# Patient Record
Sex: MALE | Race: WHITE | NOT HISPANIC OR LATINO | Employment: UNEMPLOYED | URBAN - METROPOLITAN AREA
[De-identification: names, ages, dates, MRNs, and addresses within clinical notes are randomized per-mention and may not be internally consistent; named-entity substitution may affect disease eponyms.]

---

## 2023-03-13 ENCOUNTER — TELEPHONE (OUTPATIENT)
Dept: PRIMARY CARE | Facility: CLINIC | Age: 68
End: 2023-03-13
Payer: COMMERCIAL

## 2023-03-17 DIAGNOSIS — N40.1 BENIGN PROSTATIC HYPERPLASIA WITH LOWER URINARY TRACT SYMPTOMS, SYMPTOM DETAILS UNSPECIFIED: Primary | ICD-10-CM

## 2023-03-17 RX ORDER — TADALAFIL 5 MG/1
TABLET ORAL
COMMUNITY
Start: 2019-07-29 | End: 2023-03-17 | Stop reason: SDUPTHER

## 2023-03-17 RX ORDER — TADALAFIL 2.5 MG/1
1 TABLET ORAL DAILY
COMMUNITY
Start: 2022-05-03 | End: 2023-03-17 | Stop reason: SDUPTHER

## 2023-03-17 RX ORDER — LOSARTAN POTASSIUM 50 MG/1
1 TABLET ORAL DAILY
COMMUNITY
Start: 2022-11-03 | End: 2024-01-16 | Stop reason: WASHOUT

## 2023-03-17 RX ORDER — TADALAFIL 2.5 MG/1
2.5 TABLET ORAL DAILY
Qty: 30 TABLET | Refills: 2 | Status: SHIPPED | OUTPATIENT
Start: 2023-03-17 | End: 2024-03-12 | Stop reason: WASHOUT

## 2023-03-17 RX ORDER — AMLODIPINE BESYLATE 5 MG/1
1 TABLET ORAL DAILY
COMMUNITY
Start: 2023-02-28 | End: 2023-06-06

## 2023-03-17 RX ORDER — ONDANSETRON 8 MG/1
TABLET, ORALLY DISINTEGRATING ORAL EVERY 6 HOURS
COMMUNITY
Start: 2021-02-28 | End: 2024-03-12 | Stop reason: ALTCHOICE

## 2023-03-17 RX ORDER — LOSARTAN POTASSIUM 100 MG/1
TABLET ORAL
COMMUNITY
Start: 2023-01-04 | End: 2023-06-06

## 2023-06-06 ENCOUNTER — OFFICE VISIT (OUTPATIENT)
Dept: PRIMARY CARE | Facility: CLINIC | Age: 68
End: 2023-06-06
Payer: COMMERCIAL

## 2023-06-06 VITALS
SYSTOLIC BLOOD PRESSURE: 134 MMHG | BODY MASS INDEX: 23.96 KG/M2 | TEMPERATURE: 98 F | OXYGEN SATURATION: 99 % | HEIGHT: 69 IN | DIASTOLIC BLOOD PRESSURE: 80 MMHG | WEIGHT: 161.8 LBS | HEART RATE: 72 BPM

## 2023-06-06 DIAGNOSIS — J01.90 ACUTE NON-RECURRENT SINUSITIS, UNSPECIFIED LOCATION: Primary | ICD-10-CM

## 2023-06-06 PROCEDURE — 99213 OFFICE O/P EST LOW 20 MIN: CPT | Performed by: FAMILY MEDICINE

## 2023-06-06 PROCEDURE — 1159F MED LIST DOCD IN RCRD: CPT | Performed by: FAMILY MEDICINE

## 2023-06-06 PROCEDURE — 1036F TOBACCO NON-USER: CPT | Performed by: FAMILY MEDICINE

## 2023-06-06 PROCEDURE — 1160F RVW MEDS BY RX/DR IN RCRD: CPT | Performed by: FAMILY MEDICINE

## 2023-06-06 RX ORDER — AZITHROMYCIN 250 MG/1
TABLET, FILM COATED ORAL
Qty: 6 TABLET | Refills: 0 | Status: SHIPPED | OUTPATIENT
Start: 2023-06-06 | End: 2023-06-11

## 2023-06-06 RX ORDER — TADALAFIL 5 MG/1
TABLET ORAL
COMMUNITY
Start: 2023-05-09 | End: 2023-12-08 | Stop reason: SDUPTHER

## 2023-06-06 SDOH — ECONOMIC STABILITY: HOUSING INSECURITY
IN THE LAST 12 MONTHS, WAS THERE A TIME WHEN YOU DID NOT HAVE A STEADY PLACE TO SLEEP OR SLEPT IN A SHELTER (INCLUDING NOW)?: NO

## 2023-06-06 SDOH — ECONOMIC STABILITY: TRANSPORTATION INSECURITY
IN THE PAST 12 MONTHS, HAS THE LACK OF TRANSPORTATION KEPT YOU FROM MEDICAL APPOINTMENTS OR FROM GETTING MEDICATIONS?: NO

## 2023-06-06 SDOH — ECONOMIC STABILITY: TRANSPORTATION INSECURITY
IN THE PAST 12 MONTHS, HAS LACK OF TRANSPORTATION KEPT YOU FROM MEETINGS, WORK, OR FROM GETTING THINGS NEEDED FOR DAILY LIVING?: NO

## 2023-06-06 SDOH — ECONOMIC STABILITY: FOOD INSECURITY: WITHIN THE PAST 12 MONTHS, THE FOOD YOU BOUGHT JUST DIDN'T LAST AND YOU DIDN'T HAVE MONEY TO GET MORE.: NEVER TRUE

## 2023-06-06 SDOH — ECONOMIC STABILITY: INCOME INSECURITY: IN THE LAST 12 MONTHS, WAS THERE A TIME WHEN YOU WERE NOT ABLE TO PAY THE MORTGAGE OR RENT ON TIME?: NO

## 2023-06-06 SDOH — ECONOMIC STABILITY: FOOD INSECURITY: WITHIN THE PAST 12 MONTHS, YOU WORRIED THAT YOUR FOOD WOULD RUN OUT BEFORE YOU GOT MONEY TO BUY MORE.: NEVER TRUE

## 2023-06-06 ASSESSMENT — PATIENT HEALTH QUESTIONNAIRE - PHQ9
1. LITTLE INTEREST OR PLEASURE IN DOING THINGS: NOT AT ALL
SUM OF ALL RESPONSES TO PHQ9 QUESTIONS 1 & 2: 0
2. FEELING DOWN, DEPRESSED OR HOPELESS: NOT AT ALL

## 2023-06-06 ASSESSMENT — ENCOUNTER SYMPTOMS
DEPRESSION: 0
LOSS OF SENSATION IN FEET: 0
COUGH: 1
OCCASIONAL FEELINGS OF UNSTEADINESS: 0
RHINORRHEA: 1
SINUS PRESSURE: 1

## 2023-06-06 ASSESSMENT — SOCIAL DETERMINANTS OF HEALTH (SDOH)
WITHIN THE LAST YEAR, HAVE YOU BEEN KICKED, HIT, SLAPPED, OR OTHERWISE PHYSICALLY HURT BY YOUR PARTNER OR EX-PARTNER?: NO
WITHIN THE LAST YEAR, HAVE TO BEEN RAPED OR FORCED TO HAVE ANY KIND OF SEXUAL ACTIVITY BY YOUR PARTNER OR EX-PARTNER?: NO
WITHIN THE LAST YEAR, HAVE YOU BEEN AFRAID OF YOUR PARTNER OR EX-PARTNER?: NO
WITHIN THE LAST YEAR, HAVE YOU BEEN HUMILIATED OR EMOTIONALLY ABUSED IN OTHER WAYS BY YOUR PARTNER OR EX-PARTNER?: NO
HOW HARD IS IT FOR YOU TO PAY FOR THE VERY BASICS LIKE FOOD, HOUSING, MEDICAL CARE, AND HEATING?: NOT HARD AT ALL

## 2023-06-06 ASSESSMENT — LIFESTYLE VARIABLES
HOW OFTEN DO YOU HAVE A DRINK CONTAINING ALCOHOL: NEVER
SKIP TO QUESTIONS 9-10: 1
HOW MANY STANDARD DRINKS CONTAINING ALCOHOL DO YOU HAVE ON A TYPICAL DAY: PATIENT DOES NOT DRINK
AUDIT-C TOTAL SCORE: 0
HOW OFTEN DO YOU HAVE SIX OR MORE DRINKS ON ONE OCCASION: NEVER

## 2023-06-06 ASSESSMENT — PAIN SCALES - GENERAL: PAINLEVEL: 0-NO PAIN

## 2023-06-06 NOTE — PROGRESS NOTES
"Subjective   Patient ID: James Fritsche is a 67 y.o. male who presents for sick (C/o sinus infection x wk).    HPI     Review of Systems   HENT:  Positive for postnasal drip, rhinorrhea and sinus pressure.    Respiratory:  Positive for cough.        Objective   /80 (BP Location: Left arm)   Pulse 72   Temp 36.7 °C (98 °F) (Temporal)   Ht 1.753 m (5' 9\")   Wt 73.4 kg (161 lb 12.8 oz)   SpO2 99%   BMI 23.89 kg/m²     Physical Exam  Vitals and nursing note reviewed.   HENT:      Ears:      Comments: Fluid behind both TMs bilaterally     Mouth/Throat:      Comments: PND  Pulmonary:      Breath sounds: Normal breath sounds.   Neurological:      Mental Status: He is alert.         Assessment/Plan   Problem List Items Addressed This Visit    None  Visit Diagnoses       Acute non-recurrent sinusitis, unspecified location    -  Primary    Relevant Medications    azithromycin (Zithromax) 250 mg tablet               "

## 2023-08-30 PROBLEM — H91.8X3 ASYMMETRICAL HEARING LOSS: Status: ACTIVE | Noted: 2023-08-30

## 2023-08-30 PROBLEM — H93.8X1 SENSATION OF PLUGGED EAR ON RIGHT SIDE: Status: ACTIVE | Noted: 2023-08-30

## 2023-08-30 PROBLEM — I45.10 INCOMPLETE RBBB: Status: ACTIVE | Noted: 2023-08-30

## 2023-08-30 PROBLEM — B35.1 ONYCHOMYCOSIS OF TOENAIL: Status: ACTIVE | Noted: 2023-08-30

## 2023-08-30 PROBLEM — U07.1 COVID-19: Status: ACTIVE | Noted: 2023-08-30

## 2023-08-30 PROBLEM — M19.90 CHRONIC OSTEOARTHRITIS: Status: ACTIVE | Noted: 2023-08-30

## 2023-08-30 PROBLEM — H91.91 UNILATERAL HEARING LOSS, RIGHT: Status: ACTIVE | Noted: 2023-08-30

## 2023-08-30 PROBLEM — J01.90 ACUTE SINUSITIS: Status: ACTIVE | Noted: 2023-08-30

## 2023-08-30 PROBLEM — H61.23 BILATERAL IMPACTED CERUMEN: Status: ACTIVE | Noted: 2023-08-30

## 2023-08-30 PROBLEM — I20.89 EFFORT ANGINA (CMS-HCC): Status: ACTIVE | Noted: 2023-08-30

## 2023-08-30 PROBLEM — B02.9 HERPES ZOSTER: Status: ACTIVE | Noted: 2023-08-30

## 2023-08-30 PROBLEM — I10 BENIGN HYPERTENSION: Status: ACTIVE | Noted: 2023-08-30

## 2023-08-30 PROBLEM — N40.0 BENIGN ENLARGEMENT OF PROSTATE: Status: ACTIVE | Noted: 2023-08-30

## 2023-08-30 PROBLEM — E78.2 HYPERLIPIDEMIA, MIXED: Status: ACTIVE | Noted: 2023-08-30

## 2023-08-30 RX ORDER — AMLODIPINE BESYLATE 5 MG/1
1 TABLET ORAL DAILY
COMMUNITY
Start: 2023-02-28 | End: 2024-01-16 | Stop reason: WASHOUT

## 2023-08-30 RX ORDER — ACYCLOVIR 800 MG/1
TABLET ORAL
COMMUNITY
End: 2024-03-12 | Stop reason: ALTCHOICE

## 2023-08-30 RX ORDER — IBUPROFEN 800 MG/1
TABLET ORAL EVERY 8 HOURS
COMMUNITY
End: 2024-03-12 | Stop reason: ALTCHOICE

## 2023-10-19 ENCOUNTER — TELEPHONE (OUTPATIENT)
Dept: PRIMARY CARE | Facility: CLINIC | Age: 68
End: 2023-10-19
Payer: COMMERCIAL

## 2023-10-19 NOTE — TELEPHONE ENCOUNTER
Pt stopped in to have his BP checked because it has been elevated. Today was 153/94- he has not been taking his BP meds and would like you to give him a call back.

## 2023-11-01 ENCOUNTER — TELEPHONE (OUTPATIENT)
Dept: PRIMARY CARE | Facility: CLINIC | Age: 68
End: 2023-11-01
Payer: COMMERCIAL

## 2023-12-08 DIAGNOSIS — N52.9 ERECTILE DYSFUNCTION, UNSPECIFIED ERECTILE DYSFUNCTION TYPE: Primary | ICD-10-CM

## 2023-12-08 RX ORDER — TADALAFIL 5 MG/1
TABLET ORAL
Qty: 30 TABLET | Refills: 1 | Status: SHIPPED | OUTPATIENT
Start: 2023-12-08 | End: 2024-03-12 | Stop reason: WASHOUT

## 2024-01-12 ENCOUNTER — NURSE TRIAGE (OUTPATIENT)
Dept: PRIMARY CARE | Facility: CLINIC | Age: 69
End: 2024-01-12
Payer: COMMERCIAL

## 2024-01-16 ENCOUNTER — OFFICE VISIT (OUTPATIENT)
Dept: PRIMARY CARE | Facility: CLINIC | Age: 69
End: 2024-01-16
Payer: COMMERCIAL

## 2024-01-16 VITALS
BODY MASS INDEX: 26.55 KG/M2 | TEMPERATURE: 97.4 F | WEIGHT: 175.2 LBS | SYSTOLIC BLOOD PRESSURE: 160 MMHG | HEIGHT: 68 IN | DIASTOLIC BLOOD PRESSURE: 88 MMHG | HEART RATE: 63 BPM | OXYGEN SATURATION: 100 %

## 2024-01-16 DIAGNOSIS — B35.1 ONYCHOMYCOSIS OF TOENAIL: ICD-10-CM

## 2024-01-16 DIAGNOSIS — I10 BENIGN HYPERTENSION: Primary | ICD-10-CM

## 2024-01-16 DIAGNOSIS — I73.00 RAYNAUD'S DISEASE WITHOUT GANGRENE: ICD-10-CM

## 2024-01-16 PROCEDURE — 1159F MED LIST DOCD IN RCRD: CPT | Performed by: FAMILY MEDICINE

## 2024-01-16 PROCEDURE — 1160F RVW MEDS BY RX/DR IN RCRD: CPT | Performed by: FAMILY MEDICINE

## 2024-01-16 PROCEDURE — 1126F AMNT PAIN NOTED NONE PRSNT: CPT | Performed by: FAMILY MEDICINE

## 2024-01-16 PROCEDURE — 1036F TOBACCO NON-USER: CPT | Performed by: FAMILY MEDICINE

## 2024-01-16 PROCEDURE — 3077F SYST BP >= 140 MM HG: CPT | Performed by: FAMILY MEDICINE

## 2024-01-16 PROCEDURE — 99213 OFFICE O/P EST LOW 20 MIN: CPT | Performed by: FAMILY MEDICINE

## 2024-01-16 PROCEDURE — 3079F DIAST BP 80-89 MM HG: CPT | Performed by: FAMILY MEDICINE

## 2024-01-16 RX ORDER — LOSARTAN POTASSIUM 100 MG/1
100 TABLET ORAL DAILY
COMMUNITY
Start: 2023-12-20 | End: 2024-01-16 | Stop reason: ALTCHOICE

## 2024-01-16 RX ORDER — NIFEDIPINE 60 MG/1
60 TABLET, FILM COATED, EXTENDED RELEASE ORAL DAILY
Qty: 90 TABLET | Refills: 1 | Status: SHIPPED | OUTPATIENT
Start: 2024-01-16 | End: 2024-03-12 | Stop reason: SINTOL

## 2024-01-16 ASSESSMENT — ENCOUNTER SYMPTOMS
OCCASIONAL FEELINGS OF UNSTEADINESS: 0
LOSS OF SENSATION IN FEET: 0
LIGHT-HEADEDNESS: 1
DEPRESSION: 0
CARDIOVASCULAR NEGATIVE: 1

## 2024-01-16 ASSESSMENT — PAIN SCALES - GENERAL: PAINLEVEL: 0-NO PAIN

## 2024-01-16 NOTE — PROGRESS NOTES
"Subjective   Patient ID: James Fritsche is a 68 y.o. male who presents for misc (Discuss bp).    HPI     Review of Systems   Cardiovascular: Negative.         Increased BP   Neurological:  Positive for light-headedness.       Objective   /88 (BP Location: Left arm)   Pulse 63   Temp 36.3 °C (97.4 °F) (Temporal)   Ht 1.727 m (5' 8\")   Wt 79.5 kg (175 lb 3.2 oz)   SpO2 100%   BMI 26.64 kg/m²     Physical Exam  Cardiovascular:      Rate and Rhythm: Normal rate and regular rhythm.      Heart sounds: Normal heart sounds.   Pulmonary:      Breath sounds: Normal breath sounds.         Assessment/Plan patient seen here to follow-up on his blood pressure and his Raynaud's phenomena.  We are switching to nifedipine for his blood pressure and for help with his Raynaud's phenomena  Problem List Items Addressed This Visit             ICD-10-CM    Benign hypertension - Primary I10    Relevant Medications    NIFEdipine ER (Adalat CC) 60 mg 24 hr tablet    Onychomycosis of toenail B35.1     Other Visit Diagnoses         Codes    Raynaud's disease without gangrene     I73.00    Relevant Medications    NIFEdipine ER (Adalat CC) 60 mg 24 hr tablet               "

## 2024-03-12 ENCOUNTER — OFFICE VISIT (OUTPATIENT)
Dept: PRIMARY CARE | Facility: CLINIC | Age: 69
End: 2024-03-12
Payer: COMMERCIAL

## 2024-03-12 VITALS
WEIGHT: 176.4 LBS | BODY MASS INDEX: 26.13 KG/M2 | SYSTOLIC BLOOD PRESSURE: 124 MMHG | TEMPERATURE: 97.7 F | DIASTOLIC BLOOD PRESSURE: 70 MMHG | HEIGHT: 69 IN

## 2024-03-12 DIAGNOSIS — N40.1 BENIGN PROSTATIC HYPERPLASIA WITH LOWER URINARY TRACT SYMPTOMS, SYMPTOM DETAILS UNSPECIFIED: ICD-10-CM

## 2024-03-12 DIAGNOSIS — H91.8X3 ASYMMETRICAL HEARING LOSS: ICD-10-CM

## 2024-03-12 DIAGNOSIS — I45.10 INCOMPLETE RBBB: ICD-10-CM

## 2024-03-12 DIAGNOSIS — I10 BENIGN HYPERTENSION: ICD-10-CM

## 2024-03-12 DIAGNOSIS — Z00.00 MEDICARE ANNUAL WELLNESS VISIT, SUBSEQUENT: Primary | ICD-10-CM

## 2024-03-12 DIAGNOSIS — E78.2 HYPERLIPIDEMIA, MIXED: ICD-10-CM

## 2024-03-12 DIAGNOSIS — H61.23 BILATERAL IMPACTED CERUMEN: ICD-10-CM

## 2024-03-12 DIAGNOSIS — L98.9 SKIN LESIONS: ICD-10-CM

## 2024-03-12 LAB
ALBUMIN SERPL BCP-MCNC: 4.4 G/DL (ref 3.4–5)
ALP SERPL-CCNC: 55 U/L (ref 33–136)
ALT SERPL W P-5'-P-CCNC: 25 U/L (ref 10–52)
ANION GAP SERPL CALC-SCNC: 14 MMOL/L (ref 10–20)
AST SERPL W P-5'-P-CCNC: 25 U/L (ref 9–39)
BASOPHILS # BLD AUTO: 0.02 X10*3/UL (ref 0–0.1)
BASOPHILS NFR BLD AUTO: 0.4 %
BILIRUB SERPL-MCNC: 0.5 MG/DL (ref 0–1.2)
BUN SERPL-MCNC: 22 MG/DL (ref 6–23)
CALCIUM SERPL-MCNC: 10.1 MG/DL (ref 8.6–10.6)
CHLORIDE SERPL-SCNC: 104 MMOL/L (ref 98–107)
CHOLEST SERPL-MCNC: 205 MG/DL (ref 0–199)
CHOLESTEROL/HDL RATIO: 3.7
CO2 SERPL-SCNC: 32 MMOL/L (ref 21–32)
CREAT SERPL-MCNC: 0.96 MG/DL (ref 0.5–1.3)
EGFRCR SERPLBLD CKD-EPI 2021: 87 ML/MIN/1.73M*2
EOSINOPHIL # BLD AUTO: 0.04 X10*3/UL (ref 0–0.7)
EOSINOPHIL NFR BLD AUTO: 0.8 %
ERYTHROCYTE [DISTWIDTH] IN BLOOD BY AUTOMATED COUNT: 13.7 % (ref 11.5–14.5)
GLUCOSE SERPL-MCNC: 79 MG/DL (ref 74–99)
HCT VFR BLD AUTO: 45.6 % (ref 41–52)
HDLC SERPL-MCNC: 55.8 MG/DL
HGB BLD-MCNC: 15 G/DL (ref 13.5–17.5)
IMM GRANULOCYTES # BLD AUTO: 0.01 X10*3/UL (ref 0–0.7)
IMM GRANULOCYTES NFR BLD AUTO: 0.2 % (ref 0–0.9)
LDLC SERPL CALC-MCNC: 133 MG/DL
LYMPHOCYTES # BLD AUTO: 1.05 X10*3/UL (ref 1.2–4.8)
LYMPHOCYTES NFR BLD AUTO: 21.2 %
MCH RBC QN AUTO: 31.4 PG (ref 26–34)
MCHC RBC AUTO-ENTMCNC: 32.9 G/DL (ref 32–36)
MCV RBC AUTO: 95 FL (ref 80–100)
MONOCYTES # BLD AUTO: 0.34 X10*3/UL (ref 0.1–1)
MONOCYTES NFR BLD AUTO: 6.9 %
NEUTROPHILS # BLD AUTO: 3.5 X10*3/UL (ref 1.2–7.7)
NEUTROPHILS NFR BLD AUTO: 70.5 %
NON HDL CHOLESTEROL: 149 MG/DL (ref 0–149)
NRBC BLD-RTO: 0 /100 WBCS (ref 0–0)
PLATELET # BLD AUTO: 205 X10*3/UL (ref 150–450)
POTASSIUM SERPL-SCNC: 5.5 MMOL/L (ref 3.5–5.3)
PROT SERPL-MCNC: 6.7 G/DL (ref 6.4–8.2)
PSA SERPL-MCNC: 0.93 NG/ML
RBC # BLD AUTO: 4.78 X10*6/UL (ref 4.5–5.9)
SODIUM SERPL-SCNC: 144 MMOL/L (ref 136–145)
TRIGL SERPL-MCNC: 81 MG/DL (ref 0–149)
VLDL: 16 MG/DL (ref 0–40)
WBC # BLD AUTO: 5 X10*3/UL (ref 4.4–11.3)

## 2024-03-12 PROCEDURE — 99497 ADVNCD CARE PLAN 30 MIN: CPT | Performed by: FAMILY MEDICINE

## 2024-03-12 PROCEDURE — 99397 PER PM REEVAL EST PAT 65+ YR: CPT | Performed by: FAMILY MEDICINE

## 2024-03-12 PROCEDURE — 1160F RVW MEDS BY RX/DR IN RCRD: CPT | Performed by: FAMILY MEDICINE

## 2024-03-12 PROCEDURE — 80061 LIPID PANEL: CPT

## 2024-03-12 PROCEDURE — 1036F TOBACCO NON-USER: CPT | Performed by: FAMILY MEDICINE

## 2024-03-12 PROCEDURE — 3078F DIAST BP <80 MM HG: CPT | Performed by: FAMILY MEDICINE

## 2024-03-12 PROCEDURE — 1170F FXNL STATUS ASSESSED: CPT | Performed by: FAMILY MEDICINE

## 2024-03-12 PROCEDURE — 36415 COLL VENOUS BLD VENIPUNCTURE: CPT

## 2024-03-12 PROCEDURE — 1126F AMNT PAIN NOTED NONE PRSNT: CPT | Performed by: FAMILY MEDICINE

## 2024-03-12 PROCEDURE — 80053 COMPREHEN METABOLIC PANEL: CPT

## 2024-03-12 PROCEDURE — 84153 ASSAY OF PSA TOTAL: CPT

## 2024-03-12 PROCEDURE — 3074F SYST BP LT 130 MM HG: CPT | Performed by: FAMILY MEDICINE

## 2024-03-12 PROCEDURE — 1159F MED LIST DOCD IN RCRD: CPT | Performed by: FAMILY MEDICINE

## 2024-03-12 PROCEDURE — 85025 COMPLETE CBC W/AUTO DIFF WBC: CPT

## 2024-03-12 PROCEDURE — G0439 PPPS, SUBSEQ VISIT: HCPCS | Performed by: FAMILY MEDICINE

## 2024-03-12 RX ORDER — AZITHROMYCIN 250 MG/1
TABLET, FILM COATED ORAL
COMMUNITY
Start: 2024-02-16 | End: 2024-03-12 | Stop reason: WASHOUT

## 2024-03-12 RX ORDER — LOSARTAN POTASSIUM 100 MG/1
100 TABLET ORAL DAILY
Qty: 30 TABLET | Refills: 11 | COMMUNITY
Start: 2024-03-12 | End: 2024-03-28 | Stop reason: SDUPTHER

## 2024-03-12 RX ORDER — TADALAFIL 2.5 MG/1
2.5 TABLET ORAL DAILY
Qty: 30 TABLET | Refills: 2 | COMMUNITY
Start: 2024-03-12 | End: 2024-06-05 | Stop reason: WASHOUT

## 2024-03-12 ASSESSMENT — GERIATRIC MINI NUTRITIONAL ASSESSMENT (MNA)
D HAS SUFFERED PSYCHOLOGICAL STRESS OR ACUTE DISEASE IN THE PAST 3 MONTHS?: NO
B WEIGHT LOSS DURING THE LAST 3 MONTHS: NO WEIGHT LOSS
C GENERAL MOBILITY: GOES OUT
E NEUROPSYCHOLOGICAL PROBLEMS: NO PSYCHOLOGICAL PROBLEMS
A HAS FOOD INTAKE DECLINED OVER THE PAST 3 MONTHS DUE TO LOSS OF APPETITE, DIGESTIVE PROBLEMS, CHEWING OR SWALLOWING DIFFICULTIES?: NO DECREASE IN FOOD INTAKE

## 2024-03-12 ASSESSMENT — ACTIVITIES OF DAILY LIVING (ADL)
TAKING MEDICATION: INDEPENDENT
NEEDS ASSISTANCE WITH FOOD: INDEPENDENT
MANAGING FINANCES: INDEPENDENT
USING TELEPHONE: INDEPENDENT
WALKS IN HOME: INDEPENDENT
GROOMING: INDEPENDENT
PATIENT'S MEMORY ADEQUATE TO SAFELY COMPLETE DAILY ACTIVITIES?: YES
BATHING: INDEPENDENT
GROCERY SHOPPING: INDEPENDENT
FEEDING YOURSELF: INDEPENDENT
EATING: INDEPENDENT
USING TRANSPORTATION: INDEPENDENT
STIL DRIVING: YES
TOILETING: INDEPENDENT
ADEQUATE_TO_COMPLETE_ADL: YES
JUDGMENT_ADEQUATE_SAFELY_COMPLETE_DAILY_ACTIVITIES: YES
PREPARING MEALS: INDEPENDENT
DOING HOUSEWORK: INDEPENDENT
DRESSING YOURSELF: INDEPENDENT

## 2024-03-12 ASSESSMENT — ANXIETY QUESTIONNAIRES
5. BEING SO RESTLESS THAT IT IS HARD TO SIT STILL: NOT AT ALL
7. FEELING AFRAID AS IF SOMETHING AWFUL MIGHT HAPPEN: NOT AT ALL
2. NOT BEING ABLE TO STOP OR CONTROL WORRYING: NOT AT ALL
4. TROUBLE RELAXING: NOT AT ALL
3. WORRYING TOO MUCH ABOUT DIFFERENT THINGS: NOT AT ALL
6. BECOMING EASILY ANNOYED OR IRRITABLE: NOT AT ALL
GAD7 TOTAL SCORE: 0
1. FEELING NERVOUS, ANXIOUS, OR ON EDGE: NOT AT ALL

## 2024-03-12 ASSESSMENT — ENCOUNTER SYMPTOMS
DEPRESSION: 0
NEUROLOGICAL NEGATIVE: 1
CARDIOVASCULAR NEGATIVE: 1
MUSCULOSKELETAL NEGATIVE: 1
LOSS OF SENSATION IN FEET: 0
CONSTITUTIONAL NEGATIVE: 1
PSYCHIATRIC NEGATIVE: 1
ENDOCRINE NEGATIVE: 1
GASTROINTESTINAL NEGATIVE: 1
RESPIRATORY NEGATIVE: 1
OCCASIONAL FEELINGS OF UNSTEADINESS: 0

## 2024-03-12 ASSESSMENT — PAIN SCALES - GENERAL: PAINLEVEL: 0-NO PAIN

## 2024-03-12 NOTE — ACP (ADVANCE CARE PLANNING)
Confirming Previous Code Status:   Advance Care Planning Note     Discussion Date: 03/12/24   Discussion Participants: patient    The patient wishes to discuss Advance Care Planning today and the following is a brief summary of our discussion.     Patient has capacity to make their own medical decisions: Yes  Health Care Agent/Surrogate Decision Maker documented in chart: No    Documents on file and valid:  Advance Directive/Living Will: No   Health Care Power of : No  Other: none    Communication of Medical Status/Prognosis:   yes     Communication of Treatment Goals/Options:   yes     Treatment Decisions  Goals of Care: survival is paramount regardless of prognosis, treatment outcome, or burden   yes  Follow Up Plan  no  Team Members  myself  Time Statement: Total face to face time spent on advance care planning was 16 minutes with 16 minutes spent in counseling, including the explanation.    Alexandru Aguilar DO  3/12/2024 10:14 AM

## 2024-03-12 NOTE — PROGRESS NOTES
"Subjective   Patient ID: James Fritsche is a 68 y.o. male who presents for Annual Exam (Annual medicare wellness fasting bw).    HPI     Review of Systems   Constitutional: Negative.    HENT: Negative.     Respiratory: Negative.     Cardiovascular: Negative.    Gastrointestinal: Negative.    Endocrine: Negative.    Genitourinary: Negative.    Musculoskeletal: Negative.    Neurological: Negative.    Psychiatric/Behavioral: Negative.         Objective   /70 (BP Location: Left arm)   Temp 36.5 °C (97.7 °F) (Temporal)   Ht 1.753 m (5' 9\")   Wt 80 kg (176 lb 6.4 oz)   BMI 26.05 kg/m²     Physical Exam  Vitals and nursing note reviewed.   Constitutional:       Appearance: Normal appearance.   HENT:      Ears:      Comments: Bilat cerumen impaction with right hearing loss     Mouth/Throat:      Pharynx: Oropharynx is clear.   Cardiovascular:      Rate and Rhythm: Normal rate and regular rhythm.      Pulses: Normal pulses.      Heart sounds: Normal heart sounds.   Pulmonary:      Breath sounds: Normal breath sounds.   Abdominal:      Palpations: Abdomen is soft.   Musculoskeletal:         General: Normal range of motion.   Skin:     Comments: Multiple skin lesions   Neurological:      General: No focal deficit present.      Mental Status: He is alert and oriented to person, place, and time.   Psychiatric:         Mood and Affect: Mood normal.         Behavior: Behavior normal.         Assessment/Plan patient seen here for an annual Medicare wellness exam.  We reviewed his questionnaire he is agreeable to his responses.  Did discuss advanced directives.  He has no significant difficulty depression or anxiety.  His blood pressure is better now with the losartan 100 mg a day we are checking his lab work here today we referred him to otolaryngology to recheck his hearing and have his cerumen removed and also dermatology for general skin survey.  Will see him back in a year  Problem List Items Addressed This Visit  "            ICD-10-CM    Benign enlargement of prostate N40.0    Relevant Medications    tadalafil (Cialis) 2.5 mg tablet    Other Relevant Orders    Prostate Specific Antigen    Benign hypertension I10    Relevant Orders    CBC and Auto Differential    Comprehensive Metabolic Panel    Bilateral impacted cerumen H61.23    Relevant Orders    Referral to ENT    Hyperlipidemia, mixed E78.2    Relevant Orders    Lipid Panel    Incomplete RBBB I45.10    Asymmetrical hearing loss H91.8X3    Relevant Orders    Referral to ENT     Other Visit Diagnoses         Codes    Medicare annual wellness visit, subsequent    -  Primary Z00.00    Skin lesions     L98.9    Relevant Orders    Referral to Dermatology

## 2024-03-28 DIAGNOSIS — I10 BENIGN HYPERTENSION: Primary | ICD-10-CM

## 2024-03-28 RX ORDER — LOSARTAN POTASSIUM 100 MG/1
100 TABLET ORAL DAILY
Qty: 90 TABLET | Refills: 3 | Status: SHIPPED | OUTPATIENT
Start: 2024-03-28 | End: 2025-03-28

## 2024-06-03 RX ORDER — CILOSTAZOL 100 MG/1
100 TABLET ORAL 2 TIMES DAILY
COMMUNITY
Start: 2024-03-29 | End: 2024-06-05 | Stop reason: WASHOUT

## 2024-06-05 ENCOUNTER — OFFICE VISIT (OUTPATIENT)
Dept: OTOLARYNGOLOGY | Facility: CLINIC | Age: 69
End: 2024-06-05
Payer: COMMERCIAL

## 2024-06-05 VITALS
BODY MASS INDEX: 25.48 KG/M2 | HEART RATE: 69 BPM | WEIGHT: 172 LBS | HEIGHT: 69 IN | TEMPERATURE: 98 F | DIASTOLIC BLOOD PRESSURE: 74 MMHG | SYSTOLIC BLOOD PRESSURE: 121 MMHG

## 2024-06-05 DIAGNOSIS — H61.23 BILATERAL IMPACTED CERUMEN: Primary | ICD-10-CM

## 2024-06-05 DIAGNOSIS — H91.8X3 ASYMMETRICAL HEARING LOSS: ICD-10-CM

## 2024-06-05 PROCEDURE — 99213 OFFICE O/P EST LOW 20 MIN: CPT | Performed by: NURSE PRACTITIONER

## 2024-06-05 PROCEDURE — 1159F MED LIST DOCD IN RCRD: CPT | Performed by: NURSE PRACTITIONER

## 2024-06-05 PROCEDURE — 3074F SYST BP LT 130 MM HG: CPT | Performed by: NURSE PRACTITIONER

## 2024-06-05 PROCEDURE — 99203 OFFICE O/P NEW LOW 30 MIN: CPT | Performed by: NURSE PRACTITIONER

## 2024-06-05 PROCEDURE — 69210 REMOVE IMPACTED EAR WAX UNI: CPT | Mod: 50 | Performed by: NURSE PRACTITIONER

## 2024-06-05 PROCEDURE — 69210 REMOVE IMPACTED EAR WAX UNI: CPT | Performed by: NURSE PRACTITIONER

## 2024-06-05 PROCEDURE — 1036F TOBACCO NON-USER: CPT | Performed by: NURSE PRACTITIONER

## 2024-06-05 PROCEDURE — 3078F DIAST BP <80 MM HG: CPT | Performed by: NURSE PRACTITIONER

## 2024-06-05 PROCEDURE — 1126F AMNT PAIN NOTED NONE PRSNT: CPT | Performed by: NURSE PRACTITIONER

## 2024-06-05 RX ORDER — TADALAFIL 5 MG/1
TABLET ORAL
COMMUNITY
Start: 2024-04-30

## 2024-06-05 SDOH — ECONOMIC STABILITY: FOOD INSECURITY: WITHIN THE PAST 12 MONTHS, THE FOOD YOU BOUGHT JUST DIDN'T LAST AND YOU DIDN'T HAVE MONEY TO GET MORE.: NEVER TRUE

## 2024-06-05 SDOH — ECONOMIC STABILITY: FOOD INSECURITY: WITHIN THE PAST 12 MONTHS, YOU WORRIED THAT YOUR FOOD WOULD RUN OUT BEFORE YOU GOT MONEY TO BUY MORE.: NEVER TRUE

## 2024-06-05 ASSESSMENT — ENCOUNTER SYMPTOMS
OCCASIONAL FEELINGS OF UNSTEADINESS: 0
DEPRESSION: 0
LOSS OF SENSATION IN FEET: 0

## 2024-06-05 ASSESSMENT — PATIENT HEALTH QUESTIONNAIRE - PHQ9
2. FEELING DOWN, DEPRESSED OR HOPELESS: NOT AT ALL
1. LITTLE INTEREST OR PLEASURE IN DOING THINGS: NOT AT ALL
SUM OF ALL RESPONSES TO PHQ9 QUESTIONS 1 AND 2: 0

## 2024-06-05 ASSESSMENT — LIFESTYLE VARIABLES
HOW OFTEN DO YOU HAVE A DRINK CONTAINING ALCOHOL: NEVER
SKIP TO QUESTIONS 9-10: 1
HOW OFTEN DO YOU HAVE SIX OR MORE DRINKS ON ONE OCCASION: NEVER
HOW MANY STANDARD DRINKS CONTAINING ALCOHOL DO YOU HAVE ON A TYPICAL DAY: PATIENT DOES NOT DRINK
AUDIT-C TOTAL SCORE: 0

## 2024-06-05 ASSESSMENT — PAIN SCALES - GENERAL: PAINLEVEL: 0-NO PAIN

## 2024-06-05 ASSESSMENT — COLUMBIA-SUICIDE SEVERITY RATING SCALE - C-SSRS
6. HAVE YOU EVER DONE ANYTHING, STARTED TO DO ANYTHING, OR PREPARED TO DO ANYTHING TO END YOUR LIFE?: NO
1. IN THE PAST MONTH, HAVE YOU WISHED YOU WERE DEAD OR WISHED YOU COULD GO TO SLEEP AND NOT WAKE UP?: NO
2. HAVE YOU ACTUALLY HAD ANY THOUGHTS OF KILLING YOURSELF?: NO

## 2024-06-05 NOTE — PROGRESS NOTES
"Subjective   Patient ID: James Fritsche \"James T Fritsche\" is a 68 y.o. male who presents for ear cleaning.    HPI  Patient here for his ears. Referred by his PCP to get his ears cleaned. Has hearing loss in the right ear for years. No tinnitus. Denies ear pain, drainage or dizziness. No previous ear surgery. No loud noise exposure. No family history of hearing loss.   He had a hearing test done on 7/5/2023 that showed asymmetric SNHL.     Patient Active Problem List   Diagnosis    Acute sinusitis    Benign enlargement of prostate    Benign hypertension    Bilateral impacted cerumen    Chronic osteoarthritis    COVID-19    Effort angina (CMS-HCC)    Herpes zoster    Hyperlipidemia, mixed    Incomplete RBBB    Onychomycosis of toenail    Sensation of plugged ear on right side    Asymmetrical hearing loss    Unilateral hearing loss, right     History reviewed. No pertinent surgical history.    Review of Systems    All other systems have been reviewed and are negative for complaints except for those mentioned in history of present illness, past medical history and problem list.    Objective   Physical Exam    Constitutional: No fever, chills, weight loss or weight gain  General appearance: Appears well, well-nourished, well groomed. No acute distress.    Communication: Normal communication    Psychiatric: Oriented to person, place and time. Normal mood and affect.    Neurologic: Cranial nerves II-XII grossly intact and symmetric bilaterally.    Head and Face:  Head: Atraumatic with no masses, lesions or scarring.  Face: Normal symmetry. No scars or deformities.  TMJ: Normal, no trismus.    Eyes: Conjunctiva not edematous or erythematous.     Right Ear: External inspection of ear with no deformity, scars, or masses. EAC is impacted with cerumen/hair. TM not visible.     Left Ear: External inspection of ear with no deformity, scars, or masses. EAC is impacted with cerumen/hair. TM not visible.     Nose: External " inspection of nose: No nasal lesions, lacerations or scars. Anterior rhinoscopy with limited visualization past the inferior turbinates. No tenderness on frontal or maxillary sinus palpation.    Oral Cavity/Mouth: Oral cavity and oropharynx mucosa moist and pink. No lesions or masses. Dentition normal. Tonsils appear normal. Uvula is midline. Tongue with no masses or lesions. Tongue with good mobility. The oropharynx is clear.    Neck: Normal appearing, symmetric, trachea midline.     Cardiovascular: Examination of peripheral vascular system shows no clubbing or cyanosis.    Respiratory: No respiratory distress increased work of breathing. Inspection of the chest with symmetric chest expansion and normal respiratory effort.    Skin: No head and neck rashes.    Lymph nodes: No adenopathy.     Procedure: Cerumen Removal  Indication: Cerumen Impaction  Risks, benefits, alternatives, and expectations discussed with patient and patient wishes to proceed.    Bilateral canals with cerumen impaction.  Using the microscope and alligator forceps, large amounts of soft brown cerumen removed bilaterally. Both TMs intact. No effusions or retractions noted.  Patient tolerated procedure well.      Diagnostic Results   I personally interpreted audiogram form 7/5/2023.       Assessment/Plan   Diagnoses and all orders for this visit:  Bilateral impacted cerumen  Ears were successfully cleaned.   Asymmetrical hearing loss  We discussed the different etiologies of asymmetry including noise exposure, trauma, blood flow issues, viral inflammation, and acoustic neuroma.  I recommend MRI IAC to rule out retrocochlear pathology.  Patient would like to discuss this with his PCP, and will reach back out.    All questions answered to patient satisfaction.          PROSPER Espino-CNP 06/05/24 10:51 AM

## 2024-08-28 ENCOUNTER — TELEPHONE (OUTPATIENT)
Dept: PRIMARY CARE | Facility: CLINIC | Age: 69
End: 2024-08-28
Payer: COMMERCIAL

## 2024-08-28 NOTE — TELEPHONE ENCOUNTER
Pt saw you in march & needed refill on tadalafil 5 mg 1 -2 daily to [facility]  [phone]  Allergy to penicillin  Ty

## 2024-08-29 DIAGNOSIS — N40.1 BENIGN PROSTATIC HYPERPLASIA WITH LOWER URINARY TRACT SYMPTOMS, SYMPTOM DETAILS UNSPECIFIED: Primary | ICD-10-CM

## 2024-08-29 RX ORDER — TADALAFIL 5 MG/1
5 TABLET ORAL DAILY
Qty: 30 TABLET | Refills: 3 | Status: SHIPPED | OUTPATIENT
Start: 2024-08-29

## 2024-10-01 ENCOUNTER — OFFICE VISIT (OUTPATIENT)
Dept: PRIMARY CARE | Facility: CLINIC | Age: 69
End: 2024-10-01
Payer: COMMERCIAL

## 2024-10-01 VITALS
HEART RATE: 62 BPM | OXYGEN SATURATION: 98 % | HEIGHT: 68 IN | WEIGHT: 175.2 LBS | DIASTOLIC BLOOD PRESSURE: 76 MMHG | TEMPERATURE: 97.8 F | SYSTOLIC BLOOD PRESSURE: 138 MMHG | BODY MASS INDEX: 26.55 KG/M2

## 2024-10-01 DIAGNOSIS — M17.11 PRIMARY OSTEOARTHRITIS OF RIGHT KNEE: Primary | ICD-10-CM

## 2024-10-01 DIAGNOSIS — I10 BENIGN HYPERTENSION: ICD-10-CM

## 2024-10-01 PROCEDURE — 3078F DIAST BP <80 MM HG: CPT | Performed by: FAMILY MEDICINE

## 2024-10-01 PROCEDURE — 1160F RVW MEDS BY RX/DR IN RCRD: CPT | Performed by: FAMILY MEDICINE

## 2024-10-01 PROCEDURE — 99213 OFFICE O/P EST LOW 20 MIN: CPT | Performed by: FAMILY MEDICINE

## 2024-10-01 PROCEDURE — 1126F AMNT PAIN NOTED NONE PRSNT: CPT | Performed by: FAMILY MEDICINE

## 2024-10-01 PROCEDURE — 3008F BODY MASS INDEX DOCD: CPT | Performed by: FAMILY MEDICINE

## 2024-10-01 PROCEDURE — 1036F TOBACCO NON-USER: CPT | Performed by: FAMILY MEDICINE

## 2024-10-01 PROCEDURE — 3075F SYST BP GE 130 - 139MM HG: CPT | Performed by: FAMILY MEDICINE

## 2024-10-01 PROCEDURE — 1159F MED LIST DOCD IN RCRD: CPT | Performed by: FAMILY MEDICINE

## 2024-10-01 ASSESSMENT — ENCOUNTER SYMPTOMS
ARTHRALGIAS: 1
CONSTITUTIONAL NEGATIVE: 1

## 2024-10-01 ASSESSMENT — PAIN SCALES - GENERAL: PAINLEVEL: 0-NO PAIN

## 2024-10-01 NOTE — PROGRESS NOTES
"Subjective   Patient ID: James T Fritsche Fritsche is a 68 y.o. male who presents for misc (Follow up Bp, /C/o back issue and right knee problems ).    HPI     Review of Systems   Constitutional: Negative.    Genitourinary:         ED   Musculoskeletal:  Positive for arthralgias.        Right knee pain       Objective   /76 (BP Location: Left arm)   Pulse 62   Temp 36.6 °C (97.8 °F) (Temporal)   Ht 1.727 m (5' 8\")   Wt 79.5 kg (175 lb 3.2 oz)   SpO2 98%   BMI 26.64 kg/m²     Physical Exam  Vitals and nursing note reviewed.   Constitutional:       Appearance: Normal appearance.   Cardiovascular:      Rate and Rhythm: Regular rhythm.      Heart sounds: Normal heart sounds.   Pulmonary:      Breath sounds: Normal breath sounds.   Musculoskeletal:      Comments: DJD of the right knee negative drawers and Zakia's sign small Baker's cyst in the popliteal fossa   Neurological:      Mental Status: He is alert.         Assessment/Plan patient seen here to follow-up on his blood pressure which is well-maintained.  He also has some early arthritis in his right knee or getting that x-rayed I will see him back as needed  Problem List Items Addressed This Visit             ICD-10-CM    Benign hypertension I10     Other Visit Diagnoses         Codes    Primary osteoarthritis of right knee    -  Primary M17.11    Relevant Orders    XR knee right 3 views               "

## 2024-11-19 ENCOUNTER — APPOINTMENT (OUTPATIENT)
Dept: PRIMARY CARE | Facility: CLINIC | Age: 69
End: 2024-11-19
Payer: COMMERCIAL

## 2024-12-13 ENCOUNTER — HOSPITAL ENCOUNTER (OUTPATIENT)
Facility: HOSPITAL | Age: 69
Setting detail: OBSERVATION
Discharge: HOME | End: 2024-12-14
Attending: EMERGENCY MEDICINE | Admitting: INTERNAL MEDICINE
Payer: COMMERCIAL

## 2024-12-13 ENCOUNTER — APPOINTMENT (OUTPATIENT)
Dept: RADIOLOGY | Facility: HOSPITAL | Age: 69
End: 2024-12-13
Payer: COMMERCIAL

## 2024-12-13 ENCOUNTER — APPOINTMENT (OUTPATIENT)
Dept: CARDIOLOGY | Facility: HOSPITAL | Age: 69
End: 2024-12-13
Payer: COMMERCIAL

## 2024-12-13 DIAGNOSIS — I67.82 CEREBRAL ISCHEMIA: ICD-10-CM

## 2024-12-13 DIAGNOSIS — I63.89 OTHER CEREBRAL INFARCTION: ICD-10-CM

## 2024-12-13 DIAGNOSIS — I10 BENIGN HYPERTENSION: ICD-10-CM

## 2024-12-13 DIAGNOSIS — G45.9 TIA (TRANSIENT ISCHEMIC ATTACK): Primary | ICD-10-CM

## 2024-12-13 DIAGNOSIS — I63.9 CEREBRAL INFARCTION, UNSPECIFIED: ICD-10-CM

## 2024-12-13 LAB
ALBUMIN SERPL BCP-MCNC: 4.5 G/DL (ref 3.4–5)
ALP SERPL-CCNC: 62 U/L (ref 33–136)
ALT SERPL W P-5'-P-CCNC: 22 U/L (ref 10–52)
ANION GAP SERPL CALC-SCNC: 10 MMOL/L (ref 10–20)
AST SERPL W P-5'-P-CCNC: 28 U/L (ref 9–39)
BASOPHILS # BLD AUTO: 0.02 X10*3/UL (ref 0–0.1)
BASOPHILS NFR BLD AUTO: 0.2 %
BILIRUB SERPL-MCNC: 0.5 MG/DL (ref 0–1.2)
BUN SERPL-MCNC: 21 MG/DL (ref 6–23)
CALCIUM SERPL-MCNC: 9.9 MG/DL (ref 8.6–10.3)
CARDIAC TROPONIN I PNL SERPL HS: 6 NG/L (ref 0–20)
CHLORIDE SERPL-SCNC: 101 MMOL/L (ref 98–107)
CHOLEST SERPL-MCNC: 178 MG/DL (ref 0–199)
CHOLESTEROL/HDL RATIO: 3.9
CO2 SERPL-SCNC: 30 MMOL/L (ref 21–32)
CREAT SERPL-MCNC: 0.94 MG/DL (ref 0.5–1.3)
EGFRCR SERPLBLD CKD-EPI 2021: 89 ML/MIN/1.73M*2
EOSINOPHIL # BLD AUTO: 0.02 X10*3/UL (ref 0–0.7)
EOSINOPHIL NFR BLD AUTO: 0.2 %
ERYTHROCYTE [DISTWIDTH] IN BLOOD BY AUTOMATED COUNT: 13.4 % (ref 11.5–14.5)
GLUCOSE SERPL-MCNC: 88 MG/DL (ref 74–99)
HCT VFR BLD AUTO: 43.5 % (ref 41–52)
HDLC SERPL-MCNC: 46.2 MG/DL
HGB BLD-MCNC: 14.9 G/DL (ref 13.5–17.5)
IMM GRANULOCYTES # BLD AUTO: 0.04 X10*3/UL (ref 0–0.7)
IMM GRANULOCYTES NFR BLD AUTO: 0.5 % (ref 0–0.9)
LDLC SERPL CALC-MCNC: 115 MG/DL
LYMPHOCYTES # BLD AUTO: 1.16 X10*3/UL (ref 1.2–4.8)
LYMPHOCYTES NFR BLD AUTO: 13.2 %
MAGNESIUM SERPL-MCNC: 2.08 MG/DL (ref 1.6–2.4)
MCH RBC QN AUTO: 32 PG (ref 26–34)
MCHC RBC AUTO-ENTMCNC: 34.3 G/DL (ref 32–36)
MCV RBC AUTO: 94 FL (ref 80–100)
MONOCYTES # BLD AUTO: 0.85 X10*3/UL (ref 0.1–1)
MONOCYTES NFR BLD AUTO: 9.7 %
NEUTROPHILS # BLD AUTO: 6.68 X10*3/UL (ref 1.2–7.7)
NEUTROPHILS NFR BLD AUTO: 76.2 %
NON HDL CHOLESTEROL: 132 MG/DL (ref 0–149)
NRBC BLD-RTO: 0 /100 WBCS (ref 0–0)
PLATELET # BLD AUTO: 188 X10*3/UL (ref 150–450)
POTASSIUM SERPL-SCNC: 4.4 MMOL/L (ref 3.5–5.3)
PROT SERPL-MCNC: 7.1 G/DL (ref 6.4–8.2)
RBC # BLD AUTO: 4.65 X10*6/UL (ref 4.5–5.9)
SODIUM SERPL-SCNC: 137 MMOL/L (ref 136–145)
TRIGL SERPL-MCNC: 86 MG/DL (ref 0–149)
VLDL: 17 MG/DL (ref 0–40)
WBC # BLD AUTO: 8.8 X10*3/UL (ref 4.4–11.3)

## 2024-12-13 PROCEDURE — 36415 COLL VENOUS BLD VENIPUNCTURE: CPT | Performed by: PHYSICIAN ASSISTANT

## 2024-12-13 PROCEDURE — G0378 HOSPITAL OBSERVATION PER HR: HCPCS

## 2024-12-13 PROCEDURE — 2500000001 HC RX 250 WO HCPCS SELF ADMINISTERED DRUGS (ALT 637 FOR MEDICARE OP): Performed by: INTERNAL MEDICINE

## 2024-12-13 PROCEDURE — 93005 ELECTROCARDIOGRAM TRACING: CPT

## 2024-12-13 PROCEDURE — 83735 ASSAY OF MAGNESIUM: CPT | Performed by: PHYSICIAN ASSISTANT

## 2024-12-13 PROCEDURE — 84075 ASSAY ALKALINE PHOSPHATASE: CPT | Performed by: PHYSICIAN ASSISTANT

## 2024-12-13 PROCEDURE — 71046 X-RAY EXAM CHEST 2 VIEWS: CPT | Performed by: RADIOLOGY

## 2024-12-13 PROCEDURE — 83036 HEMOGLOBIN GLYCOSYLATED A1C: CPT | Performed by: INTERNAL MEDICINE

## 2024-12-13 PROCEDURE — 99285 EMERGENCY DEPT VISIT HI MDM: CPT | Mod: 25 | Performed by: EMERGENCY MEDICINE

## 2024-12-13 PROCEDURE — 99223 1ST HOSP IP/OBS HIGH 75: CPT | Performed by: INTERNAL MEDICINE

## 2024-12-13 PROCEDURE — 85025 COMPLETE CBC W/AUTO DIFF WBC: CPT | Performed by: PHYSICIAN ASSISTANT

## 2024-12-13 PROCEDURE — 70450 CT HEAD/BRAIN W/O DYE: CPT

## 2024-12-13 PROCEDURE — 80061 LIPID PANEL: CPT | Performed by: INTERNAL MEDICINE

## 2024-12-13 PROCEDURE — 71046 X-RAY EXAM CHEST 2 VIEWS: CPT

## 2024-12-13 PROCEDURE — 84484 ASSAY OF TROPONIN QUANT: CPT | Performed by: PHYSICIAN ASSISTANT

## 2024-12-13 PROCEDURE — 99223 1ST HOSP IP/OBS HIGH 75: CPT | Performed by: NURSE PRACTITIONER

## 2024-12-13 PROCEDURE — 70450 CT HEAD/BRAIN W/O DYE: CPT | Performed by: RADIOLOGY

## 2024-12-13 RX ORDER — NAPROXEN SODIUM 220 MG/1
81 TABLET, FILM COATED ORAL DAILY
Status: DISCONTINUED | OUTPATIENT
Start: 2024-12-13 | End: 2024-12-13

## 2024-12-13 RX ORDER — ATORVASTATIN CALCIUM 40 MG/1
40 TABLET, FILM COATED ORAL NIGHTLY
Status: DISCONTINUED | OUTPATIENT
Start: 2024-12-13 | End: 2024-12-14 | Stop reason: HOSPADM

## 2024-12-13 RX ORDER — ENOXAPARIN SODIUM 100 MG/ML
40 INJECTION SUBCUTANEOUS EVERY 24 HOURS
Status: DISCONTINUED | OUTPATIENT
Start: 2024-12-13 | End: 2024-12-14 | Stop reason: HOSPADM

## 2024-12-13 SDOH — SOCIAL STABILITY: SOCIAL INSECURITY
WITHIN THE LAST YEAR, HAVE YOU BEEN KICKED, HIT, SLAPPED, OR OTHERWISE PHYSICALLY HURT BY YOUR PARTNER OR EX-PARTNER?: NO

## 2024-12-13 SDOH — SOCIAL STABILITY: SOCIAL INSECURITY: WITHIN THE LAST YEAR, HAVE YOU BEEN HUMILIATED OR EMOTIONALLY ABUSED IN OTHER WAYS BY YOUR PARTNER OR EX-PARTNER?: NO

## 2024-12-13 SDOH — ECONOMIC STABILITY: INCOME INSECURITY: IN THE PAST 12 MONTHS HAS THE ELECTRIC, GAS, OIL, OR WATER COMPANY THREATENED TO SHUT OFF SERVICES IN YOUR HOME?: NO

## 2024-12-13 SDOH — HEALTH STABILITY: MENTAL HEALTH
DO YOU FEEL STRESS - TENSE, RESTLESS, NERVOUS, OR ANXIOUS, OR UNABLE TO SLEEP AT NIGHT BECAUSE YOUR MIND IS TROUBLED ALL THE TIME - THESE DAYS?: NOT AT ALL

## 2024-12-13 SDOH — ECONOMIC STABILITY: FOOD INSECURITY: WITHIN THE PAST 12 MONTHS, YOU WORRIED THAT YOUR FOOD WOULD RUN OUT BEFORE YOU GOT THE MONEY TO BUY MORE.: NEVER TRUE

## 2024-12-13 SDOH — SOCIAL STABILITY: SOCIAL INSECURITY: ABUSE: ADULT

## 2024-12-13 SDOH — SOCIAL STABILITY: SOCIAL INSECURITY: HAS ANYONE EVER THREATENED TO HURT YOUR FAMILY OR YOUR PETS?: NO

## 2024-12-13 SDOH — SOCIAL STABILITY: SOCIAL INSECURITY: DO YOU FEEL UNSAFE GOING BACK TO THE PLACE WHERE YOU ARE LIVING?: NO

## 2024-12-13 SDOH — SOCIAL STABILITY: SOCIAL INSECURITY: DO YOU FEEL ANYONE HAS EXPLOITED OR TAKEN ADVANTAGE OF YOU FINANCIALLY OR OF YOUR PERSONAL PROPERTY?: NO

## 2024-12-13 SDOH — SOCIAL STABILITY: SOCIAL INSECURITY: WERE YOU ABLE TO COMPLETE ALL THE BEHAVIORAL HEALTH SCREENINGS?: YES

## 2024-12-13 SDOH — SOCIAL STABILITY: SOCIAL INSECURITY
WITHIN THE LAST YEAR, HAVE YOU BEEN RAPED OR FORCED TO HAVE ANY KIND OF SEXUAL ACTIVITY BY YOUR PARTNER OR EX-PARTNER?: NO

## 2024-12-13 SDOH — SOCIAL STABILITY: SOCIAL INSECURITY: WITHIN THE LAST YEAR, HAVE YOU BEEN AFRAID OF YOUR PARTNER OR EX-PARTNER?: NO

## 2024-12-13 SDOH — SOCIAL STABILITY: SOCIAL INSECURITY: HAVE YOU HAD THOUGHTS OF HARMING ANYONE ELSE?: NO

## 2024-12-13 SDOH — HEALTH STABILITY: MENTAL HEALTH: HAVE YOU ACTUALLY HAD ANY THOUGHTS OF KILLING YOURSELF?: NO

## 2024-12-13 SDOH — HEALTH STABILITY: MENTAL HEALTH: BEHAVIORAL HEALTH(WDL): WITHIN DEFINED LIMITS

## 2024-12-13 SDOH — SOCIAL STABILITY: SOCIAL INSECURITY: ARE YOU OR HAVE YOU BEEN THREATENED OR ABUSED PHYSICALLY, EMOTIONALLY, OR SEXUALLY BY ANYONE?: NO

## 2024-12-13 SDOH — SOCIAL STABILITY: SOCIAL INSECURITY: ARE THERE ANY APPARENT SIGNS OF INJURIES/BEHAVIORS THAT COULD BE RELATED TO ABUSE/NEGLECT?: NO

## 2024-12-13 SDOH — SOCIAL STABILITY: SOCIAL INSECURITY: DOES ANYONE TRY TO KEEP YOU FROM HAVING/CONTACTING OTHER FRIENDS OR DOING THINGS OUTSIDE YOUR HOME?: NO

## 2024-12-13 SDOH — HEALTH STABILITY: MENTAL HEALTH: SUICIDE ASSESSMENT: ADULT (C-SSRS)

## 2024-12-13 SDOH — ECONOMIC STABILITY: FOOD INSECURITY: WITHIN THE PAST 12 MONTHS, THE FOOD YOU BOUGHT JUST DIDN'T LAST AND YOU DIDN'T HAVE MONEY TO GET MORE.: NEVER TRUE

## 2024-12-13 SDOH — HEALTH STABILITY: MENTAL HEALTH: HAVE YOU EVER DONE ANYTHING, STARTED TO DO ANYTHING, OR PREPARED TO DO ANYTHING TO END YOUR LIFE?: NO

## 2024-12-13 SDOH — HEALTH STABILITY: MENTAL HEALTH: HAVE YOU WISHED YOU WERE DEAD OR WISHED YOU COULD GO TO SLEEP AND NOT WAKE UP?: NO

## 2024-12-13 ASSESSMENT — COGNITIVE AND FUNCTIONAL STATUS - GENERAL
PATIENT BASELINE BEDBOUND: NO
DAILY ACTIVITIY SCORE: 24
MOBILITY SCORE: 24

## 2024-12-13 ASSESSMENT — LIFESTYLE VARIABLES
AUDIT-C TOTAL SCORE: 0
SKIP TO QUESTIONS 9-10: 1
SKIP TO QUESTIONS 9-10: 1
HOW OFTEN DO YOU HAVE 6 OR MORE DRINKS ON ONE OCCASION: NEVER
EVER FELT BAD OR GUILTY ABOUT YOUR DRINKING: NO
AUDIT-C TOTAL SCORE: 0
AUDIT-C TOTAL SCORE: 0
EVER HAD A DRINK FIRST THING IN THE MORNING TO STEADY YOUR NERVES TO GET RID OF A HANGOVER: NO
HOW MANY STANDARD DRINKS CONTAINING ALCOHOL DO YOU HAVE ON A TYPICAL DAY: PATIENT DOES NOT DRINK
HAVE PEOPLE ANNOYED YOU BY CRITICIZING YOUR DRINKING: NO
HOW OFTEN DO YOU HAVE A DRINK CONTAINING ALCOHOL: NEVER
TOTAL SCORE: 0
HOW OFTEN DO YOU HAVE A DRINK CONTAINING ALCOHOL: NEVER
HOW OFTEN DO YOU HAVE 6 OR MORE DRINKS ON ONE OCCASION: NEVER
HOW MANY STANDARD DRINKS CONTAINING ALCOHOL DO YOU HAVE ON A TYPICAL DAY: PATIENT DOES NOT DRINK
HAVE YOU EVER FELT YOU SHOULD CUT DOWN ON YOUR DRINKING: NO
AUDIT-C TOTAL SCORE: 0

## 2024-12-13 ASSESSMENT — PATIENT HEALTH QUESTIONNAIRE - PHQ9
SUM OF ALL RESPONSES TO PHQ9 QUESTIONS 1 & 2: 0
SUM OF ALL RESPONSES TO PHQ9 QUESTIONS 1 & 2: 0
1. LITTLE INTEREST OR PLEASURE IN DOING THINGS: NOT AT ALL
2. FEELING DOWN, DEPRESSED OR HOPELESS: NOT AT ALL
1. LITTLE INTEREST OR PLEASURE IN DOING THINGS: NOT AT ALL
2. FEELING DOWN, DEPRESSED OR HOPELESS: NOT AT ALL

## 2024-12-13 ASSESSMENT — ACTIVITIES OF DAILY LIVING (ADL)
GROOMING: INDEPENDENT
ASSISTIVE_DEVICE: EYEGLASSES
TOILETING: INDEPENDENT
BATHING: INDEPENDENT
WALKS IN HOME: INDEPENDENT
ADEQUATE_TO_COMPLETE_ADL: YES
HEARING - LEFT EAR: FUNCTIONAL
DRESSING YOURSELF: INDEPENDENT
PATIENT'S MEMORY ADEQUATE TO SAFELY COMPLETE DAILY ACTIVITIES?: YES
LACK_OF_TRANSPORTATION: NO
JUDGMENT_ADEQUATE_SAFELY_COMPLETE_DAILY_ACTIVITIES: YES
FEEDING YOURSELF: INDEPENDENT
HEARING - RIGHT EAR: FUNCTIONAL

## 2024-12-13 ASSESSMENT — PAIN SCALES - GENERAL
PAINLEVEL_OUTOF10: 0 - NO PAIN
PAINLEVEL_OUTOF10: 0 - NO PAIN

## 2024-12-13 ASSESSMENT — PAIN - FUNCTIONAL ASSESSMENT: PAIN_FUNCTIONAL_ASSESSMENT: 0-10

## 2024-12-13 NOTE — ED PROVIDER NOTES
Limitations to History: none  External Records Reviewed  Independent Historians: self  Social determinants affecting care: none    HPI  James Fritsche is a 69 y.o. male with history of hypertension, who presents emergency department for assessment of anxiety.  He reports that over the last 4 to 5 days he has had increased anxiety due to stress dealing with the family.  He reports that he feels like his heart is racing and feels short of breath.  He reports that he has just been attributing this to anxiety.  Last night he noticed he had numbness to the left side of his face and his left arm.  This lasted for about 15 minutes and then resolved on its own.  He denies any weakness to the left side.  He denies any facial droop, slurred speech, confusion.  He reports that he did have a lot of pressure behind eyes when this happened.  He denies any chest pains.  His daughter is at bedside.  He has no further complaints.    PMH  No past medical history on file. reviewed by myself.    Meds  Current Outpatient Medications   Medication Instructions    cyanocobalamin, vitamin B-12, 200 mcg/spray spray,suspension 1 spray, Daily    losartan (COZAAR) 100 mg, oral, Daily    magnesium, amino acid chelate, 133 mg tablet 1 tablet, 2 times daily    RED BEET ROOT-SOUR CHERRY EXT ORAL 1 Scoop, Daily    tadalafil (CIALIS) 5 mg, oral, Daily    vitamin D3/vitamin K2, MK4, (K2 PLUS D3 ORAL) 1 tablet, Daily       Allergies  Allergies   Allergen Reactions    Penicillin Rash    reviewed by myself.    SHx  Social History     Tobacco Use    Smoking status: Former     Types: Cigarettes    Smokeless tobacco: Never   Substance Use Topics    Alcohol use: Never    Drug use: Never    reviewed by myself.      ------------------------------------------------------------------------------------------------------------------------------------------    /84 (BP Location: Right arm, Patient Position: Sitting)   Pulse 62   Temp 36.1 °C (97 °F)  "(Temporal)   Resp 18   Ht 1.727 m (5' 8\")   Wt 79.4 kg (175 lb)   SpO2 100%   BMI 26.61 kg/m²     Physical Exam  Vitals and nursing note reviewed.   Constitutional:       General: He is not in acute distress.     Appearance: Normal appearance. He is well-developed and normal weight. He is not toxic-appearing.   HENT:      Head: Normocephalic.      Nose: Nose normal.      Mouth/Throat:      Mouth: Mucous membranes are moist.      Pharynx: Oropharynx is clear.   Eyes:      Extraocular Movements: Extraocular movements intact.      Conjunctiva/sclera: Conjunctivae normal.   Cardiovascular:      Rate and Rhythm: Normal rate and regular rhythm.   Pulmonary:      Effort: Pulmonary effort is normal.      Breath sounds: Normal breath sounds.   Abdominal:      General: Abdomen is flat. Bowel sounds are normal.      Palpations: Abdomen is soft.      Tenderness: There is no abdominal tenderness.   Musculoskeletal:         General: Normal range of motion.      Cervical back: Neck supple.      Right lower leg: No edema.      Left lower leg: No edema.   Skin:     General: Skin is warm and dry.   Neurological:      General: No focal deficit present.      Mental Status: He is alert and oriented to person, place, and time.      Cranial Nerves: Cranial nerves 2-12 are intact.      Sensory: Sensation is intact.      Motor: Motor function is intact.      Coordination: Coordination is intact.      Comments: Slightly decreased sensation to the left jaw but otherwise normal sensation to the  face.  NIH 0.   Psychiatric:         Attention and Perception: Attention normal.         Mood and Affect: Mood normal.          ------------------------------------------------------------------------------------------------------------------------------------------  Labs  Labs Reviewed   CBC WITH AUTO DIFFERENTIAL - Abnormal       Result Value    WBC 8.8      nRBC 0.0      RBC 4.65      Hemoglobin 14.9      Hematocrit 43.5      MCV 94      MCH " 32.0      MCHC 34.3      RDW 13.4      Platelets 188      Neutrophils % 76.2      Immature Granulocytes %, Automated 0.5      Lymphocytes % 13.2      Monocytes % 9.7      Eosinophils % 0.2      Basophils % 0.2      Neutrophils Absolute 6.68      Immature Granulocytes Absolute, Automated 0.04      Lymphocytes Absolute 1.16 (*)     Monocytes Absolute 0.85      Eosinophils Absolute 0.02      Basophils Absolute 0.02     COMPREHENSIVE METABOLIC PANEL - Normal    Glucose 88      Sodium 137      Potassium 4.4      Chloride 101      Bicarbonate 30      Anion Gap 10      Urea Nitrogen 21      Creatinine 0.94      eGFR 89      Calcium 9.9      Albumin 4.5      Alkaline Phosphatase 62      Total Protein 7.1      AST 28      Bilirubin, Total 0.5      ALT 22     MAGNESIUM - Normal    Magnesium 2.08     TROPONIN I, HIGH SENSITIVITY - Normal    Troponin I, High Sensitivity 6      Narrative:     Less than 99th percentile of normal range cutoff-  Female and children under 18 years old <14 ng/L; Male <21 ng/L: Negative  Repeat testing should be performed if clinically indicated.     Female and children under 18 years old 14-50 ng/L; Male 21-50 ng/L:  Consistent with possible cardiac damage and possible increased clinical   risk. Serial measurements may help to assess extent of myocardial damage.     >50 ng/L: Consistent with cardiac damage, increased clinical risk and  myocardial infarction. Serial measurements may help assess extent of   myocardial damage.      NOTE: Children less than 1 year old may have higher baseline troponin   levels and results should be interpreted in conjunction with the overall   clinical context.     NOTE: Troponin I testing is performed using a different   testing methodology at [facility]3] than at other   [facility]. Direct result comparisons should only   be made within the same method.   LIPID PANEL   HEMOGLOBIN A1C        Imaging  CT head wo IV contrast   Final Result   No acute  intracranial abnormality.             MACRO:   none        Signed by: Zaria Barlow 12/13/2024 12:14 PM   Dictation workstation:   [id number]      XR chest 2 views   Final Result   1.  No evidence of acute cardiopulmonary process.                  MACRO:   None        Signed by: Alcira Ruth 12/13/2024 11:30 AM   Dictation workstation:   [id number]      Transthoracic Echo (TTE) Complete    (Results Pending)   MR brain wo IV contrast    (Results Pending)   MR angio head wo IV contrast    (Results Pending)   MR angio neck wo IV contrast    (Results Pending)        ED Course  Diagnoses as of 12/13/24 1407   TIA (transient ischemic attack)        Medical Decision Making: He did not appear ill or toxic.  Vital signs reviewed and stable.    Differential diagnoses considered: TIA, electrolyte normalities, panic attack, ACS, others    EKG interpreted by myself and ED attending: Sinus bradycardia.  Ventricular rate 58 bpm.  No acute ST elevations or depressions.  Right bundle branch block noted.    I reviewed the labs from today.  No leukocytosis or leukopenia.  H&H stable.  BUN and creatinine normal.  Troponin negative.  Magnesium negative.  Chest x-ray showing no acute cardiopulmonary process.  CT of the head showing no acute intracranial abnormality.  Patient symptoms are concerning for possible TIA.  Will admit for further workup.  He is agreeable.  Case discussed and evaluated with ED attending who is agreeable to patient plan of care.  I spoke with the hospitalist service for patient admission.  Dr. Pavon will admit.    Diagnosis: TIA   Plan: admit     Michael Martinez PA-C  12/13/24 [id number]

## 2024-12-13 NOTE — ED TRIAGE NOTES
Patient arrives from home with complaints of high anxiety and stress, he states last night around 930pm he had facial numbness with eye pressure and left arm pain  that turned to numb kind of feeling that lasted for 5 minutes or so then went away.  Patient also notes some shortness of breath and palpitations at times.

## 2024-12-13 NOTE — PROGRESS NOTES
"Pharmacy Medication History Review    James Fritsche \"James T Fritsche\" is a 69 y.o. male admitted for TIA (transient ischemic attack). Pharmacy reviewed the patient's ziugl-md-lwlubrxvy medications and allergies for accuracy.    The list below reflectives the updated PTA list. Please review each medication in order reconciliation for additional clarification and justification.  Prior to Admission medications    Medication Sig Start Date End Date Taking? Authorizing Provider   cyanocobalamin, vitamin B-12, 200 mcg/spray spray,suspension Place 1 spray under the tongue once daily.   Yes Historical Provider, MD   losartan (Cozaar) 100 mg tablet Take 1 tablet (100 mg) by mouth once daily.  Patient taking differently: Take 0.5 tablets (50 mg) by mouth once daily. 3/28/24 3/28/25 Yes Eugene Hampton DO   magnesium, amino acid chelate, 133 mg tablet Take 1 tablet (133 mg) by mouth 2 times a day.   Yes Historical Provider, MD   RED BEET ROOT-SOUR CHERRY EXT ORAL Take 1 Scoop by mouth once daily.   Yes Historical Provider, MD   vitamin D3/vitamin K2, MK4, (K2 PLUS D3 ORAL) Take 1 tablet by mouth once daily.   Yes Historical Provider, MD   tadalafil (Cialis) 5 mg tablet Take 1 tablet (5 mg) by mouth once daily.  Patient not taking: Reported on 12/13/2024 8/29/24  no Alexandru Aguilar,         The list below reflectives the updated allergy list. Please review each documented allergy for additional clarification and justification.  Allergies  Reviewed by Ronnie Garcia RN on 12/13/2024        Severity Reactions Comments    Penicillin Medium Rash             Below are additional concerns with the patient's PTA list.      Gardenia Curry    "

## 2024-12-13 NOTE — H&P
"                                             Hospital Medicine History & Physical       Patient Name: James Fritsche   YOB: 1955    Subjective:    James Fritsche is a 69 y.o. male who presents to the hospital with complaints of L arm and face numbness. Patient states he has been under a lot of stress of late due to family issues. He has had trouble sleeping. He has noted his BP was elevated. He started taking his Losartan again about 2 weeks ago due to this. Last night he noticed that his L arm and L lower face felt heavy and numb. It initially got better but this morning he did not feel completely back to normal so he presented to the ED. His NIHSS in the ED was 0. His CT head was negative.     A 10 point ROS was completed and is negative expect as stated in HPI.     Past Medical History:  HTN  FLACO  GERD  Hx testicular cancer    Past Surgical History:  Orchidectomy     Social History: Tobacco - Former User, Alcohol - none, Recreational Drugs - none    Family History: family history is not on file.     Objective:    /84 (BP Location: Right arm, Patient Position: Sitting)   Pulse 62   Temp 36.1 °C (97 °F) (Temporal)   Resp 18   Ht 1.727 m (5' 8\")   Wt 79.4 kg (175 lb)   SpO2 100%   BMI 26.61 kg/m²     Physical Exam:    GENERAL: well developed, non-toxic, NAD, alert & cooperative  HEENT: normocephalic, atraumatic, sclera clear  CARDIAC: regular rate & rhythm, S1/S2  PULMONARY: CTA b/l, no respiratory distress, - wheezes  ABDOMEN: soft, non-tender, non-distended  MSK: no peripheral edema, no obvious deformity  NEURO: A&O X 3, non-focal, some decreased sensation on the L face in the V2-3 distribution  SKIN: Warm and dry, no lesions, no rashes.  PSYCH: anxious     Assessment/Plan:    TIA vs CVA  HTN  FLACO      Obtain MRI/MRA. Echo. Tele. Patient declines ASA therapy due to GERD (explained risks/benefits). High intensity statin. Neurochecks. Neuro consult  Symptoms could also be a manifestation " of anxiety. I discussed various treatment strategies but patient declines pharmacologic therapy.      DVT Prophylaxis: Lovenox  Code Status: FULL  Disposition: Home tmrw      Williams Pavon, DO  Hospital Medicine

## 2024-12-13 NOTE — CONSULTS
"Inpatient consult to Neurology  Consult performed by: PROSPER Mcelroy-CNP  Consult ordered by: Williams Pavon DO          History Of Present Illness  James Fritsche \"James T Fritsche\" is a 69 y.o. male presenting with complaints of L arm and face numbness. Patient states he has been under a lot of stress of late due to family issues. He has had trouble sleeping. He has noted his BP was elevated. He started taking his Losartan again about 2 weeks ago due to this. Last night he noticed that his L arm and L lower face felt heavy and numb. It initially got better but this morning he did not feel completely back to normal so he presented to the ED. He does not take any ASA or blood thinners.    While in the emergency room patient was taken to CT scan and imaging was negative for any acute findings.  NIH score 0.  Given low NIH score patient was deemed not a candidate for tenecteplase, also given that last known well was last p.m.  He has refused initiation of ASA given that he has a history of GERD.  Stroke order set placed and consult orders placed for neurology.    A 10 point review of systems was completed and negative otherwise noted above in HPI.      Past Medical History  No past medical history on file.  Surgical History  No past surgical history on file.  Social History  Social History     Tobacco Use    Smoking status: Former     Types: Cigarettes    Smokeless tobacco: Never   Substance Use Topics    Alcohol use: Never    Drug use: Never   No family history on file.    Allergies  Penicillin  (Not in a hospital admission)    Physical exam/neurological exam  Patient seen and examined at this time; upon entering room he is resting quietly on edge of bed with his daughter at bedside.  Appears fully developed and well nourished.   Mental status: A&Ox 3. Memory testing, fund of knowledge and concentration within normal limits. Speech is fluent and negative for any paraphrasic errors.     Cranial Nerves:  Optic II/ " Oculomotor III: Fundoscopic exam was technically difficult. PERRL +2. Visual fields are full. Convergence and accomodation noted without difficulty. Negative for deficits to visual acuity confrontation via static-finger wiggle test. Eyes appear aligned and free of exophthalmos and ptosis. Sclera are white bilaterally and lens are free from clouding.   Oculomotor III/ Trochlear IV/ Abducens VI: Extraocular movements are full, with no evidence of nystagmus. Negative for diplopia.   Trigeminal V: Facial sensation is asymmetric to light touch, with patient reporting dullness to left cheek. Corneal reflex responsive when threatened bilaterally.  Facial VII: intact; nose is midline, with no evidence of flattening to nasolabial folds noted and mouth is negative for evidence of droop. Patient is successfully able to follow commands to raise eyebrows, squeeze eyes shut, smile and show teeth, frown, and puff out cheeks.   Acoustic VIII: Hearing is intact bilaterally.  Glossopharngyeal IX/ Vagus X: Palate elevates symmetrically to phonation. Findings are negative for uvula deviation or dysphagia.   Spinal accessory XI: Sternocleidomastoid/ upper trapezius is 5/5 to strength testing. No asymmetry noted to strength, bulk, or tone.   Hypoglossal XII: Tongue is midline and without deviations. Phonation is articulate and is negative for findings of dysarthria or aphasia.     Motor exam: negative for evidence of involuntary movements or fasiculations. BUE flexion of biceps and brachioradialis graded 5/5, in addition to extension of triceps at elbow and wrists. BUE  strength 5/5, along with finger abduction and thumb opposition. BLE hip flexion, extension, adduction, and abduction 5/5. Knee extension & flexion 5/5. Ankle dorsiflexion and plantarflexion 5/5. Normal bulk and normal tone.     Sensory exam: Sensation is intact to light touch throughout.    Reflexes: Reflexes are 2+ and symmetric. Bilateral plantar responses are  "flexor. Ankle jerks symmetric.     Coordination: finger-to-nose testing is negative for signs of dysmetria. Pronator drift testing to BUE negative. Rapid alternating hand movements WNL.    Gait exam: negative for ataxia.    Last Recorded Vitals  Blood pressure 135/84, pulse 62, temperature 36.1 °C (97 °F), temperature source Temporal, resp. rate 18, height 1.727 m (5' 8\"), weight 79.4 kg (175 lb), SpO2 100%.    Relevant Results  Scheduled medications  atorvastatin, 40 mg, oral, Nightly  enoxaparin, 40 mg, subcutaneous, q24h      Continuous medications     PRN medications    CT head wo IV contrast    Result Date: 12/13/2024  Interpreted By:  Zaria Barlow, STUDY: CT HEAD WO IV CONTRAST; ;  12/13/2024 11:44 am   INDICATION: Signs/Symptoms:tingling left arm and face.   COMPARISON: None.   ACCESSION NUMBER(S): [id number]   ORDERING CLINICIAN: SOTERO TRAVIS   TECHNIQUE: Serial axial images of the head were obtained without intravenous contrast. Sagittal coronal reconstructions were generated.   FINDINGS: The ventricles are midline and normal in size.   There are no acute parenchymal abnormalities.   There is no hemorrhage or extra-axial fluid.   There is no obvious scalp hematoma or skull fracture.   The paranasal sinuses and mastoids are unremarkable.   COMPARISON OF FINDINGS:       No acute intracranial abnormality.     MACRO: none   Signed by: Zaria Barlow 12/13/2024 12:14 PM Dictation workstation:   [id number]    XR chest 2 views    Result Date: 12/13/2024  Interpreted By:  Alcira Ruth, STUDY: XR CHEST 2 VIEWS;  12/13/2024 11:00 am   INDICATION: Signs/Symptoms:palpitations.     COMPARISON: 02/28/2021   ACCESSION NUMBER(S): [id number]   ORDERING CLINICIAN: SOTERO TRAVIS   FINDINGS:         CARDIOMEDIASTINAL SILHOUETTE: Cardiomediastinal silhouette is normal in size and configuration.   LUNGS: Lungs are clear.   ABDOMEN: No remarkable upper abdominal findings.   BONES: No acute osseous changes.       1.  No " evidence of acute cardiopulmonary process.       MACRO: None   Signed by: Alcira Ruth 12/13/2024 11:30 AM Dictation workstation:   [id number]   Results for orders placed or performed during the hospital encounter of 12/13/24 (from the past 24 hours)   CBC and Auto Differential   Result Value Ref Range    WBC 8.8 4.4 - 11.3 x10*3/uL    nRBC 0.0 0.0 - 0.0 /100 WBCs    RBC 4.65 4.50 - 5.90 x10*6/uL    Hemoglobin 14.9 13.5 - 17.5 g/dL    Hematocrit 43.5 41.0 - 52.0 %    MCV 94 80 - 100 fL    MCH 32.0 26.0 - 34.0 pg    MCHC 34.3 32.0 - 36.0 g/dL    RDW 13.4 11.5 - 14.5 %    Platelets 188 150 - 450 x10*3/uL    Neutrophils % 76.2 40.0 - 80.0 %    Immature Granulocytes %, Automated 0.5 0.0 - 0.9 %    Lymphocytes % 13.2 13.0 - 44.0 %    Monocytes % 9.7 2.0 - 10.0 %    Eosinophils % 0.2 0.0 - 6.0 %    Basophils % 0.2 0.0 - 2.0 %    Neutrophils Absolute 6.68 1.20 - 7.70 x10*3/uL    Immature Granulocytes Absolute, Automated 0.04 0.00 - 0.70 x10*3/uL    Lymphocytes Absolute 1.16 (L) 1.20 - 4.80 x10*3/uL    Monocytes Absolute 0.85 0.10 - 1.00 x10*3/uL    Eosinophils Absolute 0.02 0.00 - 0.70 x10*3/uL    Basophils Absolute 0.02 0.00 - 0.10 x10*3/uL   Comprehensive metabolic panel   Result Value Ref Range    Glucose 88 74 - 99 mg/dL    Sodium 137 136 - 145 mmol/L    Potassium 4.4 3.5 - 5.3 mmol/L    Chloride 101 98 - 107 mmol/L    Bicarbonate 30 21 - 32 mmol/L    Anion Gap 10 10 - 20 mmol/L    Urea Nitrogen 21 6 - 23 mg/dL    Creatinine 0.94 0.50 - 1.30 mg/dL    eGFR 89 >60 mL/min/1.73m*2    Calcium 9.9 8.6 - 10.3 mg/dL    Albumin 4.5 3.4 - 5.0 g/dL    Alkaline Phosphatase 62 33 - 136 U/L    Total Protein 7.1 6.4 - 8.2 g/dL    AST 28 9 - 39 U/L    Bilirubin, Total 0.5 0.0 - 1.2 mg/dL    ALT 22 10 - 52 U/L   Magnesium   Result Value Ref Range    Magnesium 2.08 1.60 - 2.40 mg/dL   Troponin I, High Sensitivity   Result Value Ref Range    Troponin I, High Sensitivity 6 0 - 20 ng/L   Lipid Panel   Result Value Ref Range    Cholesterol  178 0 - 199 mg/dL    HDL-Cholesterol 46.2 mg/dL    Cholesterol/HDL Ratio 3.9     LDL Calculated 115 (H) <=99 mg/dL    VLDL 17 0 - 40 mg/dL    Triglycerides 86 0 - 149 mg/dL    Non HDL Cholesterol 132 0 - 149 mg/dL                         Constantine Coma Scale  Best Eye Response: Spontaneous  Best Verbal Response: Oriented  Best Motor Response: Follows commands  Constantine Coma Scale Score: 15            NIH Stroke Scale: 1         I have personally reviewed the following imaging results CT head wo IV contrast    Result Date: 12/13/2024  Interpreted By:  Zaria Barlow, STUDY: CT HEAD WO IV CONTRAST; ;  12/13/2024 11:44 am   INDICATION: Signs/Symptoms:tingling left arm and face.   COMPARISON: None.   ACCESSION NUMBER(S): [id number]   ORDERING CLINICIAN: SOTERO TRAVIS   TECHNIQUE: Serial axial images of the head were obtained without intravenous contrast. Sagittal coronal reconstructions were generated.   FINDINGS: The ventricles are midline and normal in size.   There are no acute parenchymal abnormalities.   There is no hemorrhage or extra-axial fluid.   There is no obvious scalp hematoma or skull fracture.   The paranasal sinuses and mastoids are unremarkable.   COMPARISON OF FINDINGS:       No acute intracranial abnormality.     MACRO: none   Signed by: Zaria Barlow 12/13/2024 12:14 PM Dictation workstation:   [id number]    XR chest 2 views    Result Date: 12/13/2024  Interpreted By:  Alcira Ruth, STUDY: XR CHEST 2 VIEWS;  12/13/2024 11:00 am   INDICATION: Signs/Symptoms:palpitations.     COMPARISON: 02/28/2021   ACCESSION NUMBER(S): [id number]   ORDERING CLINICIAN: SOTERO TRAVIS   FINDINGS:         CARDIOMEDIASTINAL SILHOUETTE: Cardiomediastinal silhouette is normal in size and configuration.   LUNGS: Lungs are clear.   ABDOMEN: No remarkable upper abdominal findings.   BONES: No acute osseous changes.       1.  No evidence of acute cardiopulmonary process.       MACRO: None   Signed by: Alcira Ruth  12/13/2024 11:30 AM Dictation workstation:   [id number]     Assessment/Plan   Assessment & Plan  TIA (transient ischemic attack)    --Patient to start Plavix 75 mg p.o. daily for CVA prophylaxis.  Patient refuses initiation of ASA at this time given history of GERD but he is amendable to Plavix.  It is also recommended for patient to continue atorvastatin 80 mg for additional prophylaxis.  -Complete echocardiogram with bubble study  -Neurochecks per protocol  -Hold antihypertensive medications x 24 hours unless MRI is negative for CVA  -Check fasting lipid panel and hemoglobin A1c  -Recommendations for needs during hospitalization and at discharge via PT, OT, and SLP  -Continue promotion of lifestyle modifications, such as: Strict BP and glycemic control, dietary habit changes, incorporation of daily exercise regimen, adherence to all prescription/OTC medication schedules, attendance to all follow-up appointments, cessation from smoking if applicable, abstinence from alcohol and illicit drug use if applicable  -Patient to follow-up with PCP in 1 to 2 weeks post-discharge  -Patient to follow-up with Dr. Brayden Sullivan in 4 months post-discharge  -We will continue to follow patient while hospitalized.  Thank you for allowing us to serve as part of this patient's multidisciplinary treatment team.  If any additional questions or concerns arise, please do not hesitate to contact us.    Total face-to-face time spent with patient today was approximately 80 minutes; more than 50% of my time was spent counseling patient on: preparation to see patient via chart review of resulted laboratory values, radiographic imaging, prescribed medications, and impairment of involved organ systems. Time also spent on medical examination, placing appropriate orders for testing/medications, communicating with other health care providers, counseling/educating the patient/family, and care coordination.    *This note was created using voice  recognition transcription software. Despite proofreading, unintentional typographical errors may be present. Please contact the department of neurology with any questions or concerns.           PROSPER Mcelroy-CNP   Detail Level: Detailed Detail Level: Generalized

## 2024-12-14 ENCOUNTER — APPOINTMENT (OUTPATIENT)
Dept: CARDIOLOGY | Facility: HOSPITAL | Age: 69
End: 2024-12-14
Payer: COMMERCIAL

## 2024-12-14 VITALS
RESPIRATION RATE: 17 BRPM | HEIGHT: 68 IN | DIASTOLIC BLOOD PRESSURE: 63 MMHG | SYSTOLIC BLOOD PRESSURE: 129 MMHG | WEIGHT: 175 LBS | OXYGEN SATURATION: 98 % | BODY MASS INDEX: 26.52 KG/M2 | HEART RATE: 60 BPM | TEMPERATURE: 97.2 F

## 2024-12-14 PROBLEM — G45.9 TIA (TRANSIENT ISCHEMIC ATTACK): Status: RESOLVED | Noted: 2024-12-13 | Resolved: 2024-12-14

## 2024-12-14 LAB
AORTIC VALVE MEAN GRADIENT: 4 MMHG
AORTIC VALVE PEAK VELOCITY: 1.44 M/S
AV PEAK GRADIENT: 8 MMHG
AVA (PEAK VEL): 2.93 CM2
AVA (VTI): 3.31 CM2
EJECTION FRACTION APICAL 4 CHAMBER: 64.2
EJECTION FRACTION: 58 %
LEFT ATRIUM VOLUME AREA LENGTH INDEX BSA: 28.3 ML/M2
LEFT VENTRICLE INTERNAL DIMENSION DIASTOLE: 4.8 CM (ref 3.5–6)
LEFT VENTRICULAR OUTFLOW TRACT DIAMETER: 2.2 CM
MITRAL VALVE E/A RATIO: 0.83
RIGHT VENTRICLE FREE WALL PEAK S': 13.7 CM/S
TRICUSPID ANNULAR PLANE SYSTOLIC EXCURSION: 3.6 CM

## 2024-12-14 PROCEDURE — 2500000005 HC RX 250 GENERAL PHARMACY W/O HCPCS: Performed by: INTERNAL MEDICINE

## 2024-12-14 PROCEDURE — 2500000001 HC RX 250 WO HCPCS SELF ADMINISTERED DRUGS (ALT 637 FOR MEDICARE OP): Performed by: INTERNAL MEDICINE

## 2024-12-14 PROCEDURE — 99238 HOSP IP/OBS DSCHRG MGMT 30/<: CPT | Performed by: INTERNAL MEDICINE

## 2024-12-14 PROCEDURE — 93306 TTE W/DOPPLER COMPLETE: CPT | Performed by: STUDENT IN AN ORGANIZED HEALTH CARE EDUCATION/TRAINING PROGRAM

## 2024-12-14 PROCEDURE — 93306 TTE W/DOPPLER COMPLETE: CPT

## 2024-12-14 PROCEDURE — G0378 HOSPITAL OBSERVATION PER HR: HCPCS

## 2024-12-14 RX ORDER — TALC
3 POWDER (GRAM) TOPICAL NIGHTLY PRN
Status: DISCONTINUED | OUTPATIENT
Start: 2024-12-14 | End: 2024-12-14 | Stop reason: HOSPADM

## 2024-12-14 RX ORDER — ATORVASTATIN CALCIUM 40 MG/1
40 TABLET, FILM COATED ORAL NIGHTLY
Qty: 30 TABLET | Refills: 0 | Status: SHIPPED | OUTPATIENT
Start: 2024-12-14 | End: 2025-01-13

## 2024-12-14 RX ORDER — CLOPIDOGREL BISULFATE 75 MG/1
75 TABLET ORAL DAILY
Qty: 30 TABLET | Refills: 0 | Status: SHIPPED | OUTPATIENT
Start: 2024-12-14 | End: 2025-01-13

## 2024-12-14 RX ORDER — CLOPIDOGREL BISULFATE 75 MG/1
75 TABLET ORAL DAILY
Status: DISCONTINUED | OUTPATIENT
Start: 2024-12-14 | End: 2024-12-14 | Stop reason: HOSPADM

## 2024-12-14 RX ORDER — LOSARTAN POTASSIUM 50 MG/1
50 TABLET ORAL DAILY
Status: DISCONTINUED | OUTPATIENT
Start: 2024-12-14 | End: 2024-12-14 | Stop reason: HOSPADM

## 2024-12-14 ASSESSMENT — COGNITIVE AND FUNCTIONAL STATUS - GENERAL
DAILY ACTIVITIY SCORE: 24
MOBILITY SCORE: 24

## 2024-12-14 ASSESSMENT — PAIN SCALES - WONG BAKER: WONGBAKER_NUMERICALRESPONSE: NO HURT

## 2024-12-14 ASSESSMENT — PAIN SCALES - GENERAL: PAINLEVEL_OUTOF10: 0 - NO PAIN

## 2024-12-14 NOTE — PROGRESS NOTES
Attempted to see patient x 2.  Patient was off floor for testing.  Will see patient tomorrow.  Discussed with Dr. Sullivan

## 2024-12-14 NOTE — CARE PLAN
The patient's goals for the shift include sleep    The clinical goals for the shift include neuro checks

## 2024-12-14 NOTE — CARE PLAN
Problem: Pain - Adult  Goal: Verbalizes/displays adequate comfort level or baseline comfort level  Outcome: Progressing     Problem: Safety - Adult  Goal: Free from fall injury  Outcome: Progressing     Problem: Discharge Planning  Goal: Discharge to home or other facility with appropriate resources  Outcome: Progressing     Problem: Chronic Conditions and Co-morbidities  Goal: Patient's chronic conditions and co-morbidity symptoms are monitored and maintained or improved  Outcome: Progressing   The patient's goals for the shift include sleep    The clinical goals for the shift include neuro checks    Over the shift, the patient did not make progress toward the following goals. Barriers to progression include . Recommendations to address these barriers include .

## 2024-12-14 NOTE — DISCHARGE SUMMARY
"                                                                 Hospital Medicine Discharge Summary       Patient Name: James Fritsche   YOB: 1955    Discharge Diagnosis: TIA (transient ischemic attack)   Discharge Date: 12/14/24  Discharge Location: Home    Hospital Course:    Patient presented with transient numbness in his L face and L arm. This was in the setting of severe anxiety. Symptoms have since resolved totally. He was admitted for TIA work up. Initial CT head was negative. He declined MRI due to claustrophobia, even when offered sedation. He declined aspirin therapy but did agree to Plavix for the Neurology NP. His echo is pending. As his symptoms are completely resolved now I think he can go home this afternoon after the echo is complete. I stressed the importance of the plavix and statin for prevention of CVA. I advised he continue his BP medication. I encouraged him to follow up closely with his PCP. I also offered to start SSRI for his anxiety but he declined. I encouraged continued engagement with healthcare professionals.      Time Spent: 37 minutes      Physical Exam:     /59 (BP Location: Left arm, Patient Position: Lying)   Pulse 52   Temp 36.2 °C (97.2 °F) (Temporal)   Resp 17   Ht 1.727 m (5' 8\")   Wt 79.4 kg (175 lb)   SpO2 98%   BMI 26.61 kg/m²     GENERAL: well developed, non-toxic, NAD, alert & cooperative  HEENT: normocephalic, atraumatic, sclera clear  CARDIAC: regular rate & rhythm, S1/S2  PULMONARY: CTA b/l, no respiratory distress, - wheezes  ABDOMEN: soft, non-tender, non-distended  MSK: no peripheral edema, no obvious deformity  NEURO: A&O X 3, non-focal, CN II-XII grossly intact  SKIN: Warm and dry, no lesions, no rashes.  PSYCH: appropriate mood & affect      Discharge Medications:     Your medication list        START taking these medications        Instructions Last Dose Given Next Dose Due   atorvastatin 40 mg tablet  Commonly known as: Lipitor   "    Take 1 tablet (40 mg) by mouth once daily at bedtime.       clopidogrel 75 mg tablet  Commonly known as: Plavix      Take 1 tablet (75 mg) by mouth once daily.              CONTINUE taking these medications        Instructions Last Dose Given Next Dose Due   cyanocobalamin (vitamin B-12) 200 mcg/spray spray,suspension           K2 PLUS D3 ORAL           losartan 100 mg tablet  Commonly known as: Cozaar      Take 1 tablet (100 mg) by mouth once daily.       magnesium (amino acid chelate) 133 mg tablet           RED BEET ROOT-SOUR CHERRY EXT ORAL                  ASK your doctor about these medications        Instructions Last Dose Given Next Dose Due   tadalafil 5 mg tablet  Commonly known as: Cialis      Take 1 tablet (5 mg) by mouth once daily.                 Where to Get Your Medications        These medications were sent to [id number] - [place], [place] - [address] AT [facility]  [address] [place] [address]      Phone: [phone]   atorvastatin 40 mg tablet  clopidogrel 75 mg tablet               Williams Pavon, [facility] Medicine

## 2024-12-14 NOTE — PROGRESS NOTES
12/14/24 1118   Discharge Planning   Living Arrangements Alone   Support Systems Children   Assistance Needed ialds, driving.  No assistive devices used   Type of Residence Private residence   Do you have animals or pets at home? No   Home or Post Acute Services None   Expected Discharge Disposition Home   Does the patient need discharge transport arranged? No   Stroke Family Assessment   Stroke Family Assessment Needed No   Intensity of Service   Intensity of Service 0-30 min     This TCC met with patient and daughter at bedside, introduced self and explained role.  Demographic information and insurance verified.  Patient is from home alone, independent and driving PTA.  No assistive devices used.  Denies SW needs at this time.  Patient plans to return home at discharge, no skilled needs anticipated.  Patient plans to drive self home at discharge.  Care Transitions will continue to follow.

## 2024-12-15 LAB
EST. AVERAGE GLUCOSE BLD GHB EST-MCNC: 105 MG/DL
HBA1C MFR BLD: 5.3 %

## 2024-12-16 ENCOUNTER — PATIENT OUTREACH (OUTPATIENT)
Dept: PRIMARY CARE | Facility: CLINIC | Age: 69
End: 2024-12-16
Payer: COMMERCIAL

## 2024-12-16 NOTE — PROGRESS NOTES
Discharge Facility: [facility]  Discharge Diagnosis: TIA  Admission Date: 12/13/24  Discharge Date: 12/14/24    PCP Appointment Date: 1/14/25  Specialist Appointment Date: RENEA Sullivan  Hospital Encounter and Summary Linked: Yes  See discharge assessment below for further details    Engagement  Call Start Time: 1142 (12/16/2024 12:06 PM)    Medications  Medications reviewed with patient/caregiver?: Yes (12/16/2024 12:06 PM)  Is the patient having any side effects they believe may be caused by any medication additions or changes?: No (12/16/2024 12:06 PM)  Does the patient have all medications ordered at discharge?: Yes (12/16/2024 12:06 PM)  Care Management Interventions: Provided patient education (12/16/2024 12:06 PM)  Prescription Comments: started on statin and plavix (12/16/2024 12:06 PM)  Is the patient taking all medications as directed (includes completed medication regime)?: No (12/16/2024 12:06 PM)  What is preventing the patient from taking all medications as directed?: Other (Comment) (says one of the medications started gives him upset stomach, wishes to discuss with Dr Aguilar further) (12/16/2024 12:06 PM)  Care Management Interventions: Provided patient education (12/16/2024 12:06 PM)  Medication Comments: Aware statin and plavix is used for stroke prevention. Says his BP spikes sometimes and his BP was high when he had these symptoms, will discuss with Dr Aguilar further (12/16/2024 12:06 PM)    Appointments  Does the patient have a primary care provider?: Yes (12/16/2024 12:06 PM)  Care Management Interventions: -- (Scheduled for 1/14 but sent message to office and Dr Aguilar for sooner appt if able) (12/16/2024 12:06 PM)  Has the patient kept scheduled appointments due by today?: Yes (12/16/2024 12:06 PM)  Care Management Interventions: Advised to schedule with specialist (Asked to schedule with Dr Smith 4 months) (12/16/2024 12:06 PM)    Patient Teaching  Does the patient have access  to their discharge instructions?: Yes (12/16/2024 12:06 PM)  Care Management Interventions: Reviewed instructions with patient (12/16/2024 12:06 PM)  What is the patient's perception of their health status since discharge?: Returned to baseline/stable (12/16/2024 12:06 PM)  Is the patient/caregiver able to teach back the hierarchy of who to call/visit for symptoms/problems? PCP, Specialist, Home Health nurse, Urgent Care, ED, 911: Yes (12/16/2024 12:06 PM)  Patient/Caregiver Education Comments: Aware to seek care with any new stroke symptoms, continue to keep eye on BP at home (12/16/2024 12:06 PM)    Wrap Up  Wrap Up Additional Comments: Successful transition of care outreach with patient. Patient reports doing well at home since discharge. New meds/changes reviewed with patient during outreach. Patient denies CP/SOB/abdominal pain. Patient denies further discharge questions/concerns/needs at time of outreach call. Emphasized that follow up appts are needed after discharge with PCP and reviewed needed follow ups with any specialties to assess response to treatment from hospitalization. Patient aware of my availability for non-emergent concerns. Sebastian says his symptoms resolved next morning in hospital. (12/16/2024 12:06 PM)  Call End Time: 1150 (12/16/2024 12:06 PM)

## 2024-12-19 LAB
ATRIAL RATE: 58 BPM
P AXIS: 55 DEGREES
P OFFSET: 199 MS
P ONSET: 147 MS
PR INTERVAL: 156 MS
Q ONSET: 225 MS
QRS COUNT: 9 BEATS
QRS DURATION: 136 MS
QT INTERVAL: 464 MS
QTC CALCULATION(BAZETT): 455 MS
QTC FREDERICIA: 458 MS
R AXIS: -47 DEGREES
T AXIS: 8 DEGREES
T OFFSET: 457 MS
VENTRICULAR RATE: 58 BPM

## 2025-01-02 ENCOUNTER — PATIENT OUTREACH (OUTPATIENT)
Dept: PRIMARY CARE | Facility: CLINIC | Age: 70
End: 2025-01-02
Payer: COMMERCIAL

## 2025-01-14 ENCOUNTER — APPOINTMENT (OUTPATIENT)
Dept: PRIMARY CARE | Facility: CLINIC | Age: 70
End: 2025-01-14
Payer: COMMERCIAL

## 2025-01-14 VITALS
OXYGEN SATURATION: 98 % | HEART RATE: 74 BPM | BODY MASS INDEX: 26.95 KG/M2 | HEIGHT: 68 IN | WEIGHT: 177.8 LBS | TEMPERATURE: 98.1 F | SYSTOLIC BLOOD PRESSURE: 132 MMHG | DIASTOLIC BLOOD PRESSURE: 84 MMHG

## 2025-01-14 DIAGNOSIS — G45.9 TIA (TRANSIENT ISCHEMIC ATTACK): Primary | ICD-10-CM

## 2025-01-14 DIAGNOSIS — I65.23 CAROTID STENOSIS, BILATERAL: ICD-10-CM

## 2025-01-14 DIAGNOSIS — I10 BENIGN HYPERTENSION: ICD-10-CM

## 2025-01-14 DIAGNOSIS — E78.2 HYPERLIPIDEMIA, MIXED: ICD-10-CM

## 2025-01-14 DIAGNOSIS — N40.1 BENIGN PROSTATIC HYPERPLASIA WITH LOWER URINARY TRACT SYMPTOMS, SYMPTOM DETAILS UNSPECIFIED: ICD-10-CM

## 2025-01-14 PROCEDURE — 3075F SYST BP GE 130 - 139MM HG: CPT | Performed by: FAMILY MEDICINE

## 2025-01-14 PROCEDURE — G2211 COMPLEX E/M VISIT ADD ON: HCPCS | Performed by: FAMILY MEDICINE

## 2025-01-14 PROCEDURE — 1036F TOBACCO NON-USER: CPT | Performed by: FAMILY MEDICINE

## 2025-01-14 PROCEDURE — 3008F BODY MASS INDEX DOCD: CPT | Performed by: FAMILY MEDICINE

## 2025-01-14 PROCEDURE — 99214 OFFICE O/P EST MOD 30 MIN: CPT | Performed by: FAMILY MEDICINE

## 2025-01-14 PROCEDURE — 1159F MED LIST DOCD IN RCRD: CPT | Performed by: FAMILY MEDICINE

## 2025-01-14 PROCEDURE — 1160F RVW MEDS BY RX/DR IN RCRD: CPT | Performed by: FAMILY MEDICINE

## 2025-01-14 PROCEDURE — 3079F DIAST BP 80-89 MM HG: CPT | Performed by: FAMILY MEDICINE

## 2025-01-14 PROCEDURE — 1126F AMNT PAIN NOTED NONE PRSNT: CPT | Performed by: FAMILY MEDICINE

## 2025-01-14 ASSESSMENT — ENCOUNTER SYMPTOMS
LOSS OF SENSATION IN FEET: 0
CONSTITUTIONAL NEGATIVE: 1
NEUROLOGICAL NEGATIVE: 1
ENDOCRINE NEGATIVE: 1
ARTHRALGIAS: 1
RESPIRATORY NEGATIVE: 1
NERVOUS/ANXIOUS: 1
DEPRESSION: 0
OCCASIONAL FEELINGS OF UNSTEADINESS: 0
CARDIOVASCULAR NEGATIVE: 1

## 2025-01-14 ASSESSMENT — PAIN SCALES - GENERAL: PAINLEVEL_OUTOF10: 0-NO PAIN

## 2025-01-14 NOTE — PROGRESS NOTES
"Subjective   Patient ID: James T Fritsche Fritsche is a 69 y.o. male who [facility]umbness in face and arm, bp elevated).    HPI     Review of Systems   Constitutional: Negative.    HENT: Negative.     Respiratory: Negative.     Cardiovascular: Negative.    Endocrine: Negative.    Genitourinary: Negative.    Musculoskeletal:  Positive for arthralgias.   Neurological: Negative.         Patient was admitted at [facility] overnight with possible TIA.  CAT scan was normal.   Psychiatric/Behavioral:  The patient is nervous/anxious.        Objective   /84 (BP Location: Left arm)   Pulse 74   Temp 36.7 °C (98.1 °F) (Temporal)   Ht 1.727 m (5' 8\")   Wt 80.6 kg (177 lb 12.8 oz)   SpO2 98%   BMI 27.03 kg/m²     Physical Exam  Vitals and nursing note reviewed.   Constitutional:       Appearance: Normal appearance.   Cardiovascular:      Rate and Rhythm: Normal rate and regular rhythm.      Pulses: Normal pulses.      Heart sounds: Normal heart sounds.   Pulmonary:      Breath sounds: Normal breath sounds.   Neurological:      General: No focal deficit present.      Mental Status: He is alert and oriented to person, place, and time.   Psychiatric:         Mood and Affect: Mood normal.         Behavior: Behavior normal.         Assessment/Plan patient seen here after having been admitted at [facility] with some paresthesias in his left side of his face and arm.  He was under significant stress that day and his blood pressure did get up very high we reviewed his results from the emergency room and his admission.  He is continue with his blood pressure medicine we are getting a carotid duplex scan his echocardiogram was normal.  Will see him back as needed  Problem List Items Addressed This Visit             ICD-10-CM    Benign enlargement of prostate N40.0    Relevant Orders    Prostate Specific Antigen    Benign hypertension I10    Relevant Orders    CBC and Auto " Differential    Comprehensive Metabolic Panel    Hyperlipidemia, mixed E78.2    Relevant Orders    Lipid Panel     Other Visit Diagnoses         Codes    TIA (transient ischemic attack)    -  Primary G45.9    Relevant Orders    Vascular US Carotid Artery Duplex Bilateral    Carotid stenosis, bilateral     I65.23    Relevant Orders    Vascular US Carotid Artery Duplex Bilateral

## 2025-01-23 ENCOUNTER — TELEPHONE (OUTPATIENT)
Dept: PRIMARY CARE | Facility: CLINIC | Age: 70
End: 2025-01-23
Payer: COMMERCIAL

## 2025-01-23 DIAGNOSIS — J01.90 ACUTE NON-RECURRENT SINUSITIS, UNSPECIFIED LOCATION: Primary | ICD-10-CM

## 2025-01-23 RX ORDER — AZITHROMYCIN 250 MG/1
TABLET, FILM COATED ORAL
Qty: 6 TABLET | Refills: 0 | Status: SHIPPED | OUTPATIENT
Start: 2025-01-23 | End: 2025-01-28

## 2025-01-24 ENCOUNTER — TELEPHONE (OUTPATIENT)
Dept: PRIMARY CARE | Facility: CLINIC | Age: 70
End: 2025-01-24
Payer: COMMERCIAL

## 2025-02-06 ENCOUNTER — PATIENT OUTREACH (OUTPATIENT)
Dept: PRIMARY CARE | Facility: CLINIC | Age: 70
End: 2025-02-06
Payer: COMMERCIAL

## 2025-02-13 ENCOUNTER — OFFICE VISIT (OUTPATIENT)
Dept: PRIMARY CARE | Facility: CLINIC | Age: 70
End: 2025-02-13
Payer: COMMERCIAL

## 2025-02-13 VITALS
HEIGHT: 69 IN | TEMPERATURE: 98.4 F | BODY MASS INDEX: 26.87 KG/M2 | SYSTOLIC BLOOD PRESSURE: 122 MMHG | WEIGHT: 181.4 LBS | DIASTOLIC BLOOD PRESSURE: 80 MMHG

## 2025-02-13 DIAGNOSIS — I10 BENIGN HYPERTENSION: Primary | ICD-10-CM

## 2025-02-13 DIAGNOSIS — I45.10 INCOMPLETE RBBB: ICD-10-CM

## 2025-02-13 PROCEDURE — 1126F AMNT PAIN NOTED NONE PRSNT: CPT | Performed by: FAMILY MEDICINE

## 2025-02-13 PROCEDURE — G2211 COMPLEX E/M VISIT ADD ON: HCPCS | Performed by: FAMILY MEDICINE

## 2025-02-13 PROCEDURE — 1160F RVW MEDS BY RX/DR IN RCRD: CPT | Performed by: FAMILY MEDICINE

## 2025-02-13 PROCEDURE — 99213 OFFICE O/P EST LOW 20 MIN: CPT | Performed by: FAMILY MEDICINE

## 2025-02-13 PROCEDURE — 3079F DIAST BP 80-89 MM HG: CPT | Performed by: FAMILY MEDICINE

## 2025-02-13 PROCEDURE — 3008F BODY MASS INDEX DOCD: CPT | Performed by: FAMILY MEDICINE

## 2025-02-13 PROCEDURE — 1036F TOBACCO NON-USER: CPT | Performed by: FAMILY MEDICINE

## 2025-02-13 PROCEDURE — 3074F SYST BP LT 130 MM HG: CPT | Performed by: FAMILY MEDICINE

## 2025-02-13 PROCEDURE — 1159F MED LIST DOCD IN RCRD: CPT | Performed by: FAMILY MEDICINE

## 2025-02-13 ASSESSMENT — PAIN SCALES - GENERAL: PAINLEVEL_OUTOF10: 0-NO PAIN

## 2025-02-13 ASSESSMENT — PATIENT HEALTH QUESTIONNAIRE - PHQ9
1. LITTLE INTEREST OR PLEASURE IN DOING THINGS: NOT AT ALL
2. FEELING DOWN, DEPRESSED OR HOPELESS: NOT AT ALL
SUM OF ALL RESPONSES TO PHQ9 QUESTIONS 1 AND 2: 0

## 2025-02-13 ASSESSMENT — ENCOUNTER SYMPTOMS
ARTHRALGIAS: 1
PSYCHIATRIC NEGATIVE: 1
CONSTITUTIONAL NEGATIVE: 1
PALPITATIONS: 1
OCCASIONAL FEELINGS OF UNSTEADINESS: 0
LOSS OF SENSATION IN FEET: 0
DEPRESSION: 0

## 2025-02-13 NOTE — PROGRESS NOTES
"Subjective   Patient ID: James T Fritsche Fritsche is a 69 y.o. male who presents for Follow-up (Left side problems ).    HPI     Review of Systems   Constitutional: Negative.    Cardiovascular:  Positive for palpitations.   Musculoskeletal:  Positive for arthralgias.   Psychiatric/Behavioral: Negative.         Objective   /80 (BP Location: Left arm)   Temp 36.9 °C (98.4 °F) (Temporal)   Ht 1.753 m (5' 9\")   Wt 82.3 kg (181 lb 6.4 oz)   BMI 26.79 kg/m²     Physical Exam  Vitals and nursing note reviewed.   Constitutional:       Appearance: Normal appearance.   Cardiovascular:      Rate and Rhythm: Normal rate and regular rhythm.      Heart sounds: Normal heart sounds.   Pulmonary:      Breath sounds: Normal breath sounds.   Neurological:      General: No focal deficit present.      Mental Status: He is alert and oriented to person, place, and time.   Psychiatric:         Mood and Affect: Mood normal.         Behavior: Behavior normal.         Assessment/Plan patient seen here to follow-up on his hypertension.  He feels he may be getting some palpitations.  He will let me know how his watch tracks the palpitations he is getting a carotid duplex scan this week let her know the results as soon as available.  Problem List Items Addressed This Visit             ICD-10-CM    Benign hypertension - Primary I10    Incomplete RBBB I45.10          "

## 2025-02-20 ENCOUNTER — TELEPHONE (OUTPATIENT)
Dept: PRIMARY CARE | Facility: CLINIC | Age: 70
End: 2025-02-20
Payer: COMMERCIAL

## 2025-02-20 ENCOUNTER — APPOINTMENT (OUTPATIENT)
Dept: VASCULAR MEDICINE | Facility: CLINIC | Age: 70
End: 2025-02-20
Payer: COMMERCIAL

## 2025-02-20 DIAGNOSIS — N40.1 BENIGN PROSTATIC HYPERPLASIA WITH LOWER URINARY TRACT SYMPTOMS, SYMPTOM DETAILS UNSPECIFIED: Primary | ICD-10-CM

## 2025-02-20 RX ORDER — TADALAFIL 5 MG/1
5 TABLET ORAL DAILY
Qty: 30 TABLET | Refills: 3 | Status: SHIPPED | OUTPATIENT
Start: 2025-02-20 | End: 2025-03-22

## 2025-02-20 NOTE — TELEPHONE ENCOUNTER
Rx Refill Request Telephone Encounter    Name:  James Fritsche  :  [id number]  Medication Name:  tadalafil  5 mg          Specific Pharmacy location:  [facility] [phone]  Date of last appointment:    Date of next appointment:    Best number to reach patient:  [phone]  Rx was cancelled in January.  Ty

## 2025-02-21 ENCOUNTER — HOSPITAL ENCOUNTER (OUTPATIENT)
Dept: VASCULAR MEDICINE | Facility: CLINIC | Age: 70
Discharge: HOME | End: 2025-02-21
Payer: COMMERCIAL

## 2025-02-21 DIAGNOSIS — G45.9 TIA (TRANSIENT ISCHEMIC ATTACK): ICD-10-CM

## 2025-02-21 DIAGNOSIS — R09.89 OTHER SPECIFIED SYMPTOMS AND SIGNS INVOLVING THE CIRCULATORY AND RESPIRATORY SYSTEMS: ICD-10-CM

## 2025-02-21 DIAGNOSIS — I65.23 CAROTID STENOSIS, BILATERAL: ICD-10-CM

## 2025-02-21 PROCEDURE — 93880 EXTRACRANIAL BILAT STUDY: CPT

## 2025-02-21 PROCEDURE — 93880 EXTRACRANIAL BILAT STUDY: CPT | Performed by: STUDENT IN AN ORGANIZED HEALTH CARE EDUCATION/TRAINING PROGRAM

## 2025-03-07 ENCOUNTER — PATIENT OUTREACH (OUTPATIENT)
Dept: PRIMARY CARE | Facility: CLINIC | Age: 70
End: 2025-03-07
Payer: COMMERCIAL

## 2025-04-29 ENCOUNTER — APPOINTMENT (OUTPATIENT)
Dept: CARDIOLOGY | Facility: HOSPITAL | Age: 70
End: 2025-04-29
Payer: COMMERCIAL

## 2025-04-29 ENCOUNTER — APPOINTMENT (OUTPATIENT)
Dept: RADIOLOGY | Facility: HOSPITAL | Age: 70
End: 2025-04-29
Payer: COMMERCIAL

## 2025-04-29 ENCOUNTER — HOSPITAL ENCOUNTER (EMERGENCY)
Facility: HOSPITAL | Age: 70
Discharge: HOME | End: 2025-04-29
Attending: EMERGENCY MEDICINE
Payer: COMMERCIAL

## 2025-04-29 VITALS
HEIGHT: 69 IN | TEMPERATURE: 97.2 F | RESPIRATION RATE: 32 BRPM | DIASTOLIC BLOOD PRESSURE: 85 MMHG | BODY MASS INDEX: 26.81 KG/M2 | SYSTOLIC BLOOD PRESSURE: 145 MMHG | HEART RATE: 64 BPM | OXYGEN SATURATION: 99 % | WEIGHT: 181 LBS

## 2025-04-29 DIAGNOSIS — R55 SYNCOPE, UNSPECIFIED SYNCOPE TYPE: Primary | ICD-10-CM

## 2025-04-29 LAB
ALBUMIN SERPL BCP-MCNC: 4.7 G/DL (ref 3.4–5)
ALP SERPL-CCNC: 58 U/L (ref 33–136)
ALT SERPL W P-5'-P-CCNC: 27 U/L (ref 10–52)
ANION GAP SERPL CALC-SCNC: 10 MMOL/L (ref 10–20)
AST SERPL W P-5'-P-CCNC: 34 U/L (ref 9–39)
BASOPHILS # BLD AUTO: 0.03 X10*3/UL (ref 0–0.1)
BASOPHILS NFR BLD AUTO: 0.5 %
BILIRUB SERPL-MCNC: 0.8 MG/DL (ref 0–1.2)
BUN SERPL-MCNC: 21 MG/DL (ref 6–23)
CALCIUM SERPL-MCNC: 10.2 MG/DL (ref 8.6–10.3)
CARDIAC TROPONIN I PNL SERPL HS: 6 NG/L (ref 0–20)
CARDIAC TROPONIN I PNL SERPL HS: 7 NG/L (ref 0–20)
CHLORIDE SERPL-SCNC: 104 MMOL/L (ref 98–107)
CO2 SERPL-SCNC: 28 MMOL/L (ref 21–32)
CREAT SERPL-MCNC: 1.33 MG/DL (ref 0.5–1.3)
EGFRCR SERPLBLD CKD-EPI 2021: 59 ML/MIN/1.73M*2
EOSINOPHIL # BLD AUTO: 0.06 X10*3/UL (ref 0–0.7)
EOSINOPHIL NFR BLD AUTO: 1 %
ERYTHROCYTE [DISTWIDTH] IN BLOOD BY AUTOMATED COUNT: 13.1 % (ref 11.5–14.5)
GLUCOSE SERPL-MCNC: 92 MG/DL (ref 74–99)
HCT VFR BLD AUTO: 47.2 % (ref 41–52)
HGB BLD-MCNC: 16 G/DL (ref 13.5–17.5)
IMM GRANULOCYTES # BLD AUTO: 0.02 X10*3/UL (ref 0–0.7)
IMM GRANULOCYTES NFR BLD AUTO: 0.3 % (ref 0–0.9)
LYMPHOCYTES # BLD AUTO: 1.27 X10*3/UL (ref 1.2–4.8)
LYMPHOCYTES NFR BLD AUTO: 20.8 %
MCH RBC QN AUTO: 31.7 PG (ref 26–34)
MCHC RBC AUTO-ENTMCNC: 33.9 G/DL (ref 32–36)
MCV RBC AUTO: 94 FL (ref 80–100)
MONOCYTES # BLD AUTO: 0.49 X10*3/UL (ref 0.1–1)
MONOCYTES NFR BLD AUTO: 8 %
NEUTROPHILS # BLD AUTO: 4.23 X10*3/UL (ref 1.2–7.7)
NEUTROPHILS NFR BLD AUTO: 69.4 %
NRBC BLD-RTO: 0 /100 WBCS (ref 0–0)
PLATELET # BLD AUTO: 219 X10*3/UL (ref 150–450)
POTASSIUM SERPL-SCNC: 4.3 MMOL/L (ref 3.5–5.3)
PROT SERPL-MCNC: 7.3 G/DL (ref 6.4–8.2)
RBC # BLD AUTO: 5.04 X10*6/UL (ref 4.5–5.9)
SODIUM SERPL-SCNC: 138 MMOL/L (ref 136–145)
WBC # BLD AUTO: 6.1 X10*3/UL (ref 4.4–11.3)

## 2025-04-29 PROCEDURE — 71045 X-RAY EXAM CHEST 1 VIEW: CPT | Performed by: RADIOLOGY

## 2025-04-29 PROCEDURE — 36415 COLL VENOUS BLD VENIPUNCTURE: CPT | Performed by: STUDENT IN AN ORGANIZED HEALTH CARE EDUCATION/TRAINING PROGRAM

## 2025-04-29 PROCEDURE — 84484 ASSAY OF TROPONIN QUANT: CPT | Performed by: STUDENT IN AN ORGANIZED HEALTH CARE EDUCATION/TRAINING PROGRAM

## 2025-04-29 PROCEDURE — 93005 ELECTROCARDIOGRAM TRACING: CPT

## 2025-04-29 PROCEDURE — 70450 CT HEAD/BRAIN W/O DYE: CPT | Performed by: STUDENT IN AN ORGANIZED HEALTH CARE EDUCATION/TRAINING PROGRAM

## 2025-04-29 PROCEDURE — 70450 CT HEAD/BRAIN W/O DYE: CPT

## 2025-04-29 PROCEDURE — 85025 COMPLETE CBC W/AUTO DIFF WBC: CPT | Performed by: STUDENT IN AN ORGANIZED HEALTH CARE EDUCATION/TRAINING PROGRAM

## 2025-04-29 PROCEDURE — 99285 EMERGENCY DEPT VISIT HI MDM: CPT | Mod: 25 | Performed by: EMERGENCY MEDICINE

## 2025-04-29 PROCEDURE — 71045 X-RAY EXAM CHEST 1 VIEW: CPT

## 2025-04-29 PROCEDURE — 84075 ASSAY ALKALINE PHOSPHATASE: CPT | Performed by: STUDENT IN AN ORGANIZED HEALTH CARE EDUCATION/TRAINING PROGRAM

## 2025-04-29 ASSESSMENT — LIFESTYLE VARIABLES
HAVE PEOPLE ANNOYED YOU BY CRITICIZING YOUR DRINKING: NO
HAVE YOU EVER FELT YOU SHOULD CUT DOWN ON YOUR DRINKING: NO
EVER HAD A DRINK FIRST THING IN THE MORNING TO STEADY YOUR NERVES TO GET RID OF A HANGOVER: NO
EVER FELT BAD OR GUILTY ABOUT YOUR DRINKING: NO
TOTAL SCORE: 0

## 2025-04-29 ASSESSMENT — COLUMBIA-SUICIDE SEVERITY RATING SCALE - C-SSRS
6. HAVE YOU EVER DONE ANYTHING, STARTED TO DO ANYTHING, OR PREPARED TO DO ANYTHING TO END YOUR LIFE?: NO
2. HAVE YOU ACTUALLY HAD ANY THOUGHTS OF KILLING YOURSELF?: NO
1. IN THE PAST MONTH, HAVE YOU WISHED YOU WERE DEAD OR WISHED YOU COULD GO TO SLEEP AND NOT WAKE UP?: NO

## 2025-04-29 NOTE — ED PROVIDER NOTES
Emergency Department Provider Note        History of Present Illness     History provided by: Patient and EMS  Limitations to History: None  External Records Reviewed with Brief Summary:  Prior records from December 2024 where patient had a TIA with CT head and carotid ultrasound, numbness and tingling in left side of face and arm at that    HPI:  James Fritsche is a 69 y.o. male presenting by EMS after syncopal episode.  Patient was discussing with his ex-wife about their kids that they share together, he notes they were not arguing they just were concerned for their child together.  He started feeling lightheaded, he ended up passing out.  Ex-wife called 911.  He noticed he had some numbness in his left arm and face, by the time he arrived to the ED the only symptom left is very mild numbness to his left arm but no weakness.  He can also feel things is just like pins-and-needles going to the arm.  Denies fevers or chills, denies chest pain, had some shortness of breath.  No abdominal pain, no nausea, no vomiting.  No back pain.    Physical Exam   Triage vitals:  T 36.2 °C (97.2 °F)  HR 65  BP (!) 181/87  RR 20  O2 99 %      Physical Exam  Vitals reviewed.   Constitutional:       Appearance: Normal appearance.   Eyes:      General:         Right eye: No discharge.         Left eye: No discharge.   Cardiovascular:      Rate and Rhythm: Normal rate.   Pulmonary:      Effort: Pulmonary effort is normal. No respiratory distress.   Skin:     General: Skin is warm and dry.      Capillary Refill: Capillary refill takes less than 2 seconds.   Neurological:      Mental Status: He is alert and oriented to person, place, and time.      Comments: NIH score of 1 for left upper extremity altered sensation, 5 out of 5 strength bilaterally in elbow flexion extension, knee flexion extension, no pronator drift in all 4 extremities, no visual defects or eye deviations appreciated.  No other areas of altered sensation.  No facial  asymmetry, no word slurring.  Pupils equal bilaterally.  Van negative   Psychiatric:         Mood and Affect: Mood normal.         Behavior: Behavior normal.          Medical Decision Making & ED Course   Medical Decision Makin y.o. male Presenting as per [facility], differential is broad and includes but not limited to TIA, arrhythmia, ACS, anxiety, dehydration, vasovagal reaction.   As a result, workup was ordered targeting these diagnoses including CTs,  Blood work, and EKG.  Workup came back remarkable for no signs of acute hemorrhage, no concerning arrhythmia, nonactionable blood work.   I had discussion with patient, he would like to do further workup outpatient.  Discussed the importance of maintaining appropriate blood pressure, as well as discussed with PCP the possibility of MRI outpatient.  Return precautions were given, patient was asymptomatic for several hours prior to discharge.  He acknowledged understanding, patient discharged home in stable condition with outpatient follow-up for PCP.    ----  Differential diagnoses considered include but are not limited to: as per [place]    EKG Independent Interpretation: EKG interpreted by myself. Please see ED Course for full interpretation.    Chronic conditions affecting the patient's care: As documented above in [place]    Care Considerations: As documented above in [place]    ED Course:  ED Course as of 25 1334   e 2025   1000 I personally reviewed EKG showing ventricular rate of 66, sinus rhythm, intervals regular, no concerning ST elevations or depressions, normal axis [BEVERLY]      ED Course User Index  [BEVERLY] Michael Velazquez DO         Diagnoses as of 25 1334   Syncope, unspecified syncope type     Disposition   As a result of the work-up, the patient was discharged home.  he was informed of his diagnosis and instructed to come back with any concerns or worsening of condition.  he and was agreeable to the plan as discussed above.  he was given the  opportunity to ask questions.  All of the patient's questions were answered.    Procedures   Procedures    Michael Wilson DO  Emergency Medicine      Michael Wilson DO  Resident  04/29/25 [id number]

## 2025-04-29 NOTE — DISCHARGE INSTRUCTIONS
Discharge to home, follow-up with your PCP to discuss blood pressure medication as well as potentially getting an MRI outpatient of the head to assess for TIA.  Return precautions of worsening weakness, passing out, or other concerning symptoms.

## 2025-04-29 NOTE — ED TRIAGE NOTES
Patient BIBA from home syncope, dizziness and left arm numbness and tingling. Patient states he was standing up talking and began to get dizzy, diaphoretic,sob and lightheaded , resulting in him falling to his buttocks. Patient states after falling he began to have numbness and tingling down his left arm. Patient states he had a TIA about a month ago resulting in left arm residual.  Patient present  with weakness, denies CP, and SOB on arrival.

## 2025-04-30 ENCOUNTER — TELEPHONE (OUTPATIENT)
Dept: PRIMARY CARE | Facility: CLINIC | Age: 70
End: 2025-04-30
Payer: COMMERCIAL

## 2025-04-30 DIAGNOSIS — I10 BENIGN HYPERTENSION: Primary | ICD-10-CM

## 2025-04-30 RX ORDER — LOSARTAN POTASSIUM 100 MG/1
100 TABLET ORAL DAILY
Qty: 90 TABLET | Refills: 3 | Status: SHIPPED | OUTPATIENT
Start: 2025-04-30 | End: 2026-04-30

## 2025-04-30 NOTE — TELEPHONE ENCOUNTER
Rx Refill Request Telephone Encounter    Name:  James Fritsche  :  [id number]  Medication Name: Losartan     Specific Pharmacy location:  [facility] [phone]   Date of last appointment:  2025  Date of next appointment:  5/15/25  Best number to reach patient: [phone]

## 2025-05-02 LAB
ATRIAL RATE: 66 BPM
P AXIS: 38 DEGREES
PR INTERVAL: 154 MS
Q ONSET: 252 MS
QRS COUNT: 11 BEATS
QRS DURATION: 154 MS
QT INTERVAL: 455 MS
QTC CALCULATION(BAZETT): 477 MS
QTC FREDERICIA: 470 MS
R AXIS: -34 DEGREES
T AXIS: -6 DEGREES
T OFFSET: 480 MS
VENTRICULAR RATE: 66 BPM

## 2025-05-15 ENCOUNTER — APPOINTMENT (OUTPATIENT)
Dept: PRIMARY CARE | Facility: CLINIC | Age: 70
End: 2025-05-15
Payer: COMMERCIAL

## 2025-05-15 VITALS
TEMPERATURE: 98.2 F | DIASTOLIC BLOOD PRESSURE: 83 MMHG | BODY MASS INDEX: 26.28 KG/M2 | HEIGHT: 69 IN | HEART RATE: 62 BPM | WEIGHT: 177.4 LBS | SYSTOLIC BLOOD PRESSURE: 134 MMHG | OXYGEN SATURATION: 98 %

## 2025-05-15 DIAGNOSIS — E78.2 HYPERLIPIDEMIA, MIXED: ICD-10-CM

## 2025-05-15 DIAGNOSIS — R55 VASO VAGAL EPISODE: ICD-10-CM

## 2025-05-15 DIAGNOSIS — F41.9 ANXIETY: ICD-10-CM

## 2025-05-15 DIAGNOSIS — I10 BENIGN HYPERTENSION: ICD-10-CM

## 2025-05-15 DIAGNOSIS — Z00.00 MEDICARE ANNUAL WELLNESS VISIT, SUBSEQUENT: Primary | ICD-10-CM

## 2025-05-15 PROBLEM — H61.20 IMPACTED CERUMEN: Status: ACTIVE | Noted: 2025-05-15

## 2025-05-15 PROCEDURE — 3079F DIAST BP 80-89 MM HG: CPT | Performed by: FAMILY MEDICINE

## 2025-05-15 PROCEDURE — 1126F AMNT PAIN NOTED NONE PRSNT: CPT | Performed by: FAMILY MEDICINE

## 2025-05-15 PROCEDURE — 1170F FXNL STATUS ASSESSED: CPT | Performed by: FAMILY MEDICINE

## 2025-05-15 PROCEDURE — 99397 PER PM REEVAL EST PAT 65+ YR: CPT | Performed by: FAMILY MEDICINE

## 2025-05-15 PROCEDURE — 1036F TOBACCO NON-USER: CPT | Performed by: FAMILY MEDICINE

## 2025-05-15 PROCEDURE — 1160F RVW MEDS BY RX/DR IN RCRD: CPT | Performed by: FAMILY MEDICINE

## 2025-05-15 PROCEDURE — 3075F SYST BP GE 130 - 139MM HG: CPT | Performed by: FAMILY MEDICINE

## 2025-05-15 PROCEDURE — 1159F MED LIST DOCD IN RCRD: CPT | Performed by: FAMILY MEDICINE

## 2025-05-15 PROCEDURE — 3008F BODY MASS INDEX DOCD: CPT | Performed by: FAMILY MEDICINE

## 2025-05-15 PROCEDURE — 99497 ADVNCD CARE PLAN 30 MIN: CPT | Performed by: FAMILY MEDICINE

## 2025-05-15 PROCEDURE — G0439 PPPS, SUBSEQ VISIT: HCPCS | Performed by: FAMILY MEDICINE

## 2025-05-15 ASSESSMENT — ACTIVITIES OF DAILY LIVING (ADL)
TOILETING: INDEPENDENT
STIL DRIVING: YES
WALKS IN HOME: INDEPENDENT
DOING HOUSEWORK: INDEPENDENT
TAKING MEDICATION: INDEPENDENT
USING TELEPHONE: INDEPENDENT
DRESSING YOURSELF: INDEPENDENT
FEEDING YOURSELF: INDEPENDENT
GROCERY SHOPPING: INDEPENDENT
BATHING: INDEPENDENT
PREPARING MEALS: INDEPENDENT
EATING: INDEPENDENT
USING TRANSPORTATION: INDEPENDENT
JUDGMENT_ADEQUATE_SAFELY_COMPLETE_DAILY_ACTIVITIES: YES
MANAGING FINANCES: INDEPENDENT
NEEDS ASSISTANCE WITH FOOD: INDEPENDENT
GROOMING: INDEPENDENT
PATIENT'S MEMORY ADEQUATE TO SAFELY COMPLETE DAILY ACTIVITIES?: YES
ADEQUATE_TO_COMPLETE_ADL: YES

## 2025-05-15 ASSESSMENT — ANXIETY QUESTIONNAIRES
2. NOT BEING ABLE TO STOP OR CONTROL WORRYING: NOT AT ALL
3. WORRYING TOO MUCH ABOUT DIFFERENT THINGS: NOT AT ALL
5. BEING SO RESTLESS THAT IT IS HARD TO SIT STILL: NOT AT ALL
GAD7 TOTAL SCORE: 0
6. BECOMING EASILY ANNOYED OR IRRITABLE: NOT AT ALL
4. TROUBLE RELAXING: NOT AT ALL
1. FEELING NERVOUS, ANXIOUS, OR ON EDGE: NOT AT ALL
7. FEELING AFRAID AS IF SOMETHING AWFUL MIGHT HAPPEN: NOT AT ALL

## 2025-05-15 ASSESSMENT — GERIATRIC MINI NUTRITIONAL ASSESSMENT (MNA)
D HAS SUFFERED PSYCHOLOGICAL STRESS OR ACUTE DISEASE IN THE PAST 3 MONTHS?: NO
E NEUROPSYCHOLOGICAL PROBLEMS: NO PSYCHOLOGICAL PROBLEMS
B WEIGHT LOSS DURING THE LAST 3 MONTHS: NO WEIGHT LOSS
C GENERAL MOBILITY: GOES OUT
A HAS FOOD INTAKE DECLINED OVER THE PAST 3 MONTHS DUE TO LOSS OF APPETITE, DIGESTIVE PROBLEMS, CHEWING OR SWALLOWING DIFFICULTIES?: NO DECREASE IN FOOD INTAKE

## 2025-05-15 ASSESSMENT — ENCOUNTER SYMPTOMS
NEUROLOGICAL NEGATIVE: 1
PSYCHIATRIC NEGATIVE: 1
GASTROINTESTINAL NEGATIVE: 1
MUSCULOSKELETAL NEGATIVE: 1
ENDOCRINE NEGATIVE: 1
CONSTITUTIONAL NEGATIVE: 1
DEPRESSION: 0
RESPIRATORY NEGATIVE: 1
OCCASIONAL FEELINGS OF UNSTEADINESS: 0
LOSS OF SENSATION IN FEET: 0
CARDIOVASCULAR NEGATIVE: 1

## 2025-05-15 ASSESSMENT — PATIENT HEALTH QUESTIONNAIRE - PHQ9
1. LITTLE INTEREST OR PLEASURE IN DOING THINGS: NOT AT ALL
2. FEELING DOWN, DEPRESSED OR HOPELESS: NOT AT ALL
1. LITTLE INTEREST OR PLEASURE IN DOING THINGS: NOT AT ALL
SUM OF ALL RESPONSES TO PHQ9 QUESTIONS 1 AND 2: 0
SUM OF ALL RESPONSES TO PHQ9 QUESTIONS 1 AND 2: 0
2. FEELING DOWN, DEPRESSED OR HOPELESS: NOT AT ALL

## 2025-05-15 ASSESSMENT — PAIN SCALES - GENERAL: PAINLEVEL_OUTOF10: 0-NO PAIN

## 2025-05-15 NOTE — ACP (ADVANCE CARE PLANNING)
Confirming Previous Code Status:   Advance Care Planning Note     Discussion Date: 05/15/25   Discussion Participants: patient    The patient wishes to discuss Advance Care Planning today and the following is a brief summary of our discussion.     Patient has capacity to make their own medical decisions: Yes  Health Care Agent/Surrogate Decision Maker documented in chart: Yes    Documents on file and valid:  Advance Directive/Living Will: No   Health Care Power of : No  Other: none    Communication of Medical Status/Prognosis:   yes     Communication of Treatment Goals/Options:   yes     Treatment Decisions  Goals of Care: survival is paramount regardless of prognosis, treatment outcome, or burden   yes  Follow Up Plan  no  Team Members  myself  Time Statement: Total face to face time spent on advance care planning was 16 minutes with 16 minutes spent in counseling, including the explanation.    Alexandru Aguilar DO  5/15/2025 4:04 PM

## 2025-05-15 NOTE — PROGRESS NOTES
" Subjective   Patient ID: James T Fritsche Fritsche is a 69 y.o. male who presents for Annual Exam (Annual medicare wellness ).    HPI     Review of Systems   Constitutional: Negative.    HENT:  Positive for hearing loss.    Eyes:  Positive for visual disturbance.   Respiratory: Negative.     Cardiovascular: Negative.         Increased BP   Gastrointestinal: Negative.    Endocrine: Negative.    Genitourinary: Negative.    Musculoskeletal: Negative.    Neurological: Negative.    Psychiatric/Behavioral: Negative.         Objective   /83 (BP Location: Right arm)   Pulse 62   Temp 36.8 °C (98.2 °F) (Oral)   Ht 1.753 m (5' 9\")   Wt 80.5 kg (177 lb 6.4 oz)   SpO2 98%   BMI 26.20 kg/m²     Physical Exam  Vitals and nursing note reviewed.   Constitutional:       Appearance: Normal appearance.   HENT:      Right Ear: Tympanic membrane normal.      Left Ear: Tympanic membrane normal.      Ears:      Comments: Bilateral cerumen removed     Mouth/Throat:      Pharynx: Oropharynx is clear.   Cardiovascular:      Rate and Rhythm: Normal rate and regular rhythm.      Pulses: Normal pulses.      Heart sounds: Normal heart sounds.   Pulmonary:      Breath sounds: Normal breath sounds.   Abdominal:      Palpations: Abdomen is soft.   Musculoskeletal:         General: Normal range of motion.   Neurological:      General: No focal deficit present.      Mental Status: He is alert and oriented to person, place, and time.   Psychiatric:         Mood and Affect: Mood normal.         Behavior: Behavior normal.         Assessment/Plan patient seen here for an annual Medicare wellness exam.  Reviewed his questionnaire he is agreeable to his responses.  We did discuss advanced directives.  He has significant difficulty with anxiety lately secondary to family issues.  We did review lab work and workup that was done in the emergency room for a vasovagal episode.  He has repeat lab work ordered and will do this as an outpatient.  He " also is having difficulty seeing out of his right eye I recommended an optometry exam.  His exam of his eyes here in the office is normal.  He also is having difficulty hearing out of his right ear we were able to remove cerumen.  He did see someone for the hearing evaluation but has not return for the further workup that they recommended.  Will see him back as needed  Problem List Items Addressed This Visit           ICD-10-CM    Benign hypertension I10    Hyperlipidemia, mixed E78.2     Other Visit Diagnoses         Codes      Medicare annual wellness visit, subsequent    -  Primary Z00.00      BMI 26.0-26.9,adult     Z68.26      Anxiety     F41.9      Vaso vagal episode     R55